# Patient Record
Sex: MALE | Race: WHITE | NOT HISPANIC OR LATINO | Employment: OTHER | URBAN - METROPOLITAN AREA
[De-identification: names, ages, dates, MRNs, and addresses within clinical notes are randomized per-mention and may not be internally consistent; named-entity substitution may affect disease eponyms.]

---

## 2017-04-05 ENCOUNTER — COMMUNICATION - HEALTHEAST (OUTPATIENT)
Dept: FAMILY MEDICINE | Facility: CLINIC | Age: 72
End: 2017-04-05

## 2017-04-05 DIAGNOSIS — N18.3 CHRONIC KIDNEY DISEASE (CKD), STAGE 3 (MODERATE): ICD-10-CM

## 2017-04-05 DIAGNOSIS — I10 ESSENTIAL HYPERTENSION: ICD-10-CM

## 2017-04-12 ENCOUNTER — RECORDS - HEALTHEAST (OUTPATIENT)
Dept: ADMINISTRATIVE | Facility: OTHER | Age: 72
End: 2017-04-12

## 2017-05-19 ENCOUNTER — OFFICE VISIT - HEALTHEAST (OUTPATIENT)
Dept: FAMILY MEDICINE | Facility: CLINIC | Age: 72
End: 2017-05-19

## 2017-05-19 DIAGNOSIS — N18.3 CHRONIC KIDNEY DISEASE (CKD), STAGE 3 (MODERATE): ICD-10-CM

## 2017-05-19 DIAGNOSIS — I10 ESSENTIAL HYPERTENSION: ICD-10-CM

## 2017-05-19 DIAGNOSIS — R43.2 LOSS OF TASTE: ICD-10-CM

## 2017-05-19 DIAGNOSIS — Z00.00 ROUTINE GENERAL MEDICAL EXAMINATION AT A HEALTH CARE FACILITY: ICD-10-CM

## 2017-05-19 DIAGNOSIS — D64.9 NORMOCHROMIC NORMOCYTIC ANEMIA: ICD-10-CM

## 2017-05-19 DIAGNOSIS — K40.90 INGUINAL HERNIA: ICD-10-CM

## 2017-05-19 DIAGNOSIS — K62.89 RECTAL MASS: ICD-10-CM

## 2017-05-19 LAB
ATRIAL RATE - MUSE: 54 BPM
CHOLEST SERPL-MCNC: 138 MG/DL
DIASTOLIC BLOOD PRESSURE - MUSE: NORMAL MMHG
FASTING STATUS PATIENT QL REPORTED: NO
HDLC SERPL-MCNC: 32 MG/DL
INTERPRETATION ECG - MUSE: NORMAL
LDLC SERPL CALC-MCNC: 84 MG/DL
P AXIS - MUSE: -10 DEGREES
PR INTERVAL - MUSE: 230 MS
PSA SERPL-MCNC: 1.4 NG/ML (ref 0–6.5)
QRS DURATION - MUSE: 96 MS
QT - MUSE: 430 MS
QTC - MUSE: 407 MS
R AXIS - MUSE: -16 DEGREES
SYSTOLIC BLOOD PRESSURE - MUSE: NORMAL MMHG
T AXIS - MUSE: 31 DEGREES
TRIGL SERPL-MCNC: 110 MG/DL
VENTRICULAR RATE- MUSE: 54 BPM

## 2017-05-19 ASSESSMENT — MIFFLIN-ST. JEOR: SCORE: 1491.03

## 2017-05-22 ENCOUNTER — COMMUNICATION - HEALTHEAST (OUTPATIENT)
Dept: FAMILY MEDICINE | Facility: CLINIC | Age: 72
End: 2017-05-22

## 2017-05-24 ENCOUNTER — COMMUNICATION - HEALTHEAST (OUTPATIENT)
Dept: FAMILY MEDICINE | Facility: CLINIC | Age: 72
End: 2017-05-24

## 2017-05-30 ENCOUNTER — RECORDS - HEALTHEAST (OUTPATIENT)
Dept: ADMINISTRATIVE | Facility: OTHER | Age: 72
End: 2017-05-30

## 2017-06-01 ENCOUNTER — RECORDS - HEALTHEAST (OUTPATIENT)
Dept: ADMINISTRATIVE | Facility: OTHER | Age: 72
End: 2017-06-01

## 2017-06-02 ENCOUNTER — OFFICE VISIT - HEALTHEAST (OUTPATIENT)
Dept: FAMILY MEDICINE | Facility: CLINIC | Age: 72
End: 2017-06-02

## 2017-06-02 DIAGNOSIS — K57.92 DIVERTICULITIS: ICD-10-CM

## 2017-06-22 ENCOUNTER — COMMUNICATION - HEALTHEAST (OUTPATIENT)
Dept: FAMILY MEDICINE | Facility: CLINIC | Age: 72
End: 2017-06-22

## 2017-06-22 DIAGNOSIS — I10 ESSENTIAL HYPERTENSION: ICD-10-CM

## 2017-06-22 DIAGNOSIS — N18.3 CHRONIC KIDNEY DISEASE (CKD), STAGE 3 (MODERATE): ICD-10-CM

## 2017-07-14 ENCOUNTER — COMMUNICATION - HEALTHEAST (OUTPATIENT)
Dept: FAMILY MEDICINE | Facility: CLINIC | Age: 72
End: 2017-07-14

## 2017-07-14 DIAGNOSIS — N18.3 CHRONIC KIDNEY DISEASE (CKD), STAGE 3 (MODERATE): ICD-10-CM

## 2017-07-14 DIAGNOSIS — I10 ESSENTIAL HYPERTENSION: ICD-10-CM

## 2017-08-25 ENCOUNTER — OFFICE VISIT - HEALTHEAST (OUTPATIENT)
Dept: FAMILY MEDICINE | Facility: CLINIC | Age: 72
End: 2017-08-25

## 2017-08-25 DIAGNOSIS — J32.9 SINUSITIS: ICD-10-CM

## 2017-08-25 DIAGNOSIS — J34.89 RHINORRHEA: ICD-10-CM

## 2017-08-25 ASSESSMENT — MIFFLIN-ST. JEOR: SCORE: 1504.64

## 2017-08-28 ENCOUNTER — HOSPITAL ENCOUNTER (OUTPATIENT)
Dept: CT IMAGING | Facility: HOSPITAL | Age: 72
Discharge: HOME | End: 2017-08-28
Attending: FAMILY MEDICINE

## 2017-08-28 DIAGNOSIS — J32.9 SINUSITIS: ICD-10-CM

## 2017-08-29 ENCOUNTER — RECORDS - HEALTHEAST (OUTPATIENT)
Dept: ADMINISTRATIVE | Facility: OTHER | Age: 72
End: 2017-08-29

## 2017-10-03 ENCOUNTER — RECORDS - HEALTHEAST (OUTPATIENT)
Dept: ADMINISTRATIVE | Facility: OTHER | Age: 72
End: 2017-10-03

## 2017-10-12 ENCOUNTER — COMMUNICATION - HEALTHEAST (OUTPATIENT)
Dept: FAMILY MEDICINE | Facility: CLINIC | Age: 72
End: 2017-10-12

## 2017-10-12 DIAGNOSIS — N18.30 STAGE 3 CHRONIC KIDNEY DISEASE (H): ICD-10-CM

## 2017-10-12 DIAGNOSIS — I10 ESSENTIAL HYPERTENSION: ICD-10-CM

## 2017-12-20 ENCOUNTER — OFFICE VISIT - HEALTHEAST (OUTPATIENT)
Dept: FAMILY MEDICINE | Facility: CLINIC | Age: 72
End: 2017-12-20

## 2017-12-20 DIAGNOSIS — I10 ESSENTIAL HYPERTENSION: ICD-10-CM

## 2017-12-20 DIAGNOSIS — K21.9 GERD (GASTROESOPHAGEAL REFLUX DISEASE): ICD-10-CM

## 2018-01-16 ENCOUNTER — COMMUNICATION - HEALTHEAST (OUTPATIENT)
Dept: FAMILY MEDICINE | Facility: CLINIC | Age: 73
End: 2018-01-16

## 2018-01-16 DIAGNOSIS — N18.30 STAGE 3 CHRONIC KIDNEY DISEASE (H): ICD-10-CM

## 2018-01-16 DIAGNOSIS — I10 ESSENTIAL HYPERTENSION: ICD-10-CM

## 2018-04-27 ENCOUNTER — OFFICE VISIT - HEALTHEAST (OUTPATIENT)
Dept: FAMILY MEDICINE | Facility: CLINIC | Age: 73
End: 2018-04-27

## 2018-04-27 DIAGNOSIS — I10 ESSENTIAL HYPERTENSION: ICD-10-CM

## 2018-04-27 DIAGNOSIS — K21.9 GERD (GASTROESOPHAGEAL REFLUX DISEASE): ICD-10-CM

## 2018-04-27 DIAGNOSIS — N18.30 STAGE 3 CHRONIC KIDNEY DISEASE (H): ICD-10-CM

## 2018-04-27 ASSESSMENT — MIFFLIN-ST. JEOR: SCORE: 1546.37

## 2018-06-08 ENCOUNTER — OFFICE VISIT - HEALTHEAST (OUTPATIENT)
Dept: FAMILY MEDICINE | Facility: CLINIC | Age: 73
End: 2018-06-08

## 2018-06-08 DIAGNOSIS — I10 ESSENTIAL HYPERTENSION: ICD-10-CM

## 2018-06-08 DIAGNOSIS — Z00.00 MEDICARE ANNUAL WELLNESS VISIT, INITIAL: ICD-10-CM

## 2018-06-08 DIAGNOSIS — Z80.0 FAMILY HISTORY OF COLON CANCER: ICD-10-CM

## 2018-06-08 DIAGNOSIS — N18.30 STAGE 3 CHRONIC KIDNEY DISEASE (H): ICD-10-CM

## 2018-06-08 LAB
ALBUMIN SERPL-MCNC: 4.1 G/DL (ref 3.5–5)
ALBUMIN UR-MCNC: NEGATIVE MG/DL
ALP SERPL-CCNC: 101 U/L (ref 45–120)
ALT SERPL W P-5'-P-CCNC: <9 U/L (ref 0–45)
ANION GAP SERPL CALCULATED.3IONS-SCNC: 11 MMOL/L (ref 5–18)
APPEARANCE UR: CLEAR
AST SERPL W P-5'-P-CCNC: 12 U/L (ref 0–40)
ATRIAL RATE - MUSE: 50 BPM
BASOPHILS # BLD AUTO: 0 THOU/UL (ref 0–0.2)
BASOPHILS NFR BLD AUTO: 0 % (ref 0–2)
BILIRUB SERPL-MCNC: 0.7 MG/DL (ref 0–1)
BILIRUB UR QL STRIP: NEGATIVE
BUN SERPL-MCNC: 33 MG/DL (ref 8–28)
CALCIUM SERPL-MCNC: 9.4 MG/DL (ref 8.5–10.5)
CHLORIDE BLD-SCNC: 111 MMOL/L (ref 98–107)
CHOLEST SERPL-MCNC: 154 MG/DL
CO2 SERPL-SCNC: 21 MMOL/L (ref 22–31)
COLOR UR AUTO: YELLOW
CREAT SERPL-MCNC: 2.6 MG/DL (ref 0.7–1.3)
DIASTOLIC BLOOD PRESSURE - MUSE: NORMAL MMHG
EOSINOPHIL # BLD AUTO: 0.3 THOU/UL (ref 0–0.4)
EOSINOPHIL NFR BLD AUTO: 5 % (ref 0–6)
ERYTHROCYTE [DISTWIDTH] IN BLOOD BY AUTOMATED COUNT: 13.4 % (ref 11–14.5)
FASTING STATUS PATIENT QL REPORTED: YES
GFR SERPL CREATININE-BSD FRML MDRD: 24 ML/MIN/1.73M2
GLUCOSE BLD-MCNC: 108 MG/DL (ref 70–125)
GLUCOSE UR STRIP-MCNC: NEGATIVE MG/DL
HCT VFR BLD AUTO: 40.4 % (ref 40–54)
HDLC SERPL-MCNC: 35 MG/DL
HGB BLD-MCNC: 13.9 G/DL (ref 14–18)
HGB UR QL STRIP: NEGATIVE
INTERPRETATION ECG - MUSE: NORMAL
KETONES UR STRIP-MCNC: NEGATIVE MG/DL
LDLC SERPL CALC-MCNC: 101 MG/DL
LEUKOCYTE ESTERASE UR QL STRIP: NEGATIVE
LYMPHOCYTES # BLD AUTO: 1.1 THOU/UL (ref 0.8–4.4)
LYMPHOCYTES NFR BLD AUTO: 16 % (ref 20–40)
MCH RBC QN AUTO: 30.3 PG (ref 27–34)
MCHC RBC AUTO-ENTMCNC: 34.3 G/DL (ref 32–36)
MCV RBC AUTO: 88 FL (ref 80–100)
MONOCYTES # BLD AUTO: 0.5 THOU/UL (ref 0–0.9)
MONOCYTES NFR BLD AUTO: 8 % (ref 2–10)
NEUTROPHILS # BLD AUTO: 4.9 THOU/UL (ref 2–7.7)
NEUTROPHILS NFR BLD AUTO: 71 % (ref 50–70)
NITRATE UR QL: NEGATIVE
P AXIS - MUSE: 93 DEGREES
PH UR STRIP: 5 [PH] (ref 5–8)
PLATELET # BLD AUTO: 148 THOU/UL (ref 140–440)
PMV BLD AUTO: 6.9 FL (ref 7–10)
POTASSIUM BLD-SCNC: 4.3 MMOL/L (ref 3.5–5)
PR INTERVAL - MUSE: 274 MS
PROT SERPL-MCNC: 7.1 G/DL (ref 6–8)
PSA SERPL-MCNC: 1.7 NG/ML (ref 0–6.5)
QRS DURATION - MUSE: 86 MS
QT - MUSE: 450 MS
QTC - MUSE: 410 MS
R AXIS - MUSE: -16 DEGREES
RBC # BLD AUTO: 4.57 MILL/UL (ref 4.4–6.2)
SODIUM SERPL-SCNC: 143 MMOL/L (ref 136–145)
SP GR UR STRIP: 1.01 (ref 1–1.03)
SYSTOLIC BLOOD PRESSURE - MUSE: NORMAL MMHG
T AXIS - MUSE: 22 DEGREES
TRIGL SERPL-MCNC: 90 MG/DL
UROBILINOGEN UR STRIP-ACNC: NORMAL
VENTRICULAR RATE- MUSE: 50 BPM
WBC: 6.8 THOU/UL (ref 4–11)

## 2018-06-08 ASSESSMENT — MIFFLIN-ST. JEOR: SCORE: 1545.01

## 2018-06-10 ENCOUNTER — COMMUNICATION - HEALTHEAST (OUTPATIENT)
Dept: FAMILY MEDICINE | Facility: CLINIC | Age: 73
End: 2018-06-10

## 2019-01-18 ENCOUNTER — COMMUNICATION - HEALTHEAST (OUTPATIENT)
Dept: FAMILY MEDICINE | Facility: CLINIC | Age: 74
End: 2019-01-18

## 2019-01-18 DIAGNOSIS — K21.9 GERD (GASTROESOPHAGEAL REFLUX DISEASE): ICD-10-CM

## 2019-02-22 ENCOUNTER — OFFICE VISIT - HEALTHEAST (OUTPATIENT)
Dept: FAMILY MEDICINE | Facility: CLINIC | Age: 74
End: 2019-02-22
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
## 2019-02-22 DIAGNOSIS — L53.9 ARM ERYTHEMA: ICD-10-CM

## 2019-02-22 DIAGNOSIS — M25.422 ELBOW SWELLING, LEFT: ICD-10-CM

## 2019-02-22 DIAGNOSIS — M70.22 OLECRANON BURSITIS OF LEFT ELBOW: ICD-10-CM

## 2019-02-22 DIAGNOSIS — L03.114 CELLULITIS OF LEFT UPPER EXTREMITY: ICD-10-CM

## 2019-02-22 LAB
APPEARANCE FLD: ABNORMAL
COLOR FLD: YELLOW
CRYSTALS SNV MICRO: ABNORMAL
GLUCOSE FLD-MCNC: 78 MG/DL
LYMPHOCYTES NFR FLD MANUAL: 17 %
MACROPHAGE % - HISTORICAL: 22 %
NEUTS BAND NFR FLD MANUAL: 62 %
PROT FLD-MCNC: 3.9 G/DL
RBC FLUID - HISTORICAL: ABNORMAL /UL
WBC # FLD AUTO: 1747 /UL (ref 0–99)

## 2019-02-25 LAB
BACTERIA SPEC CULT: NO GROWTH
GRAM STAIN RESULT: NORMAL
GRAM STAIN RESULT: NORMAL

## 2019-03-08 ENCOUNTER — OFFICE VISIT - HEALTHEAST (OUTPATIENT)
Dept: FAMILY MEDICINE | Facility: CLINIC | Age: 74
End: 2019-03-08

## 2019-03-08 DIAGNOSIS — M70.22 OLECRANON BURSITIS OF LEFT ELBOW: ICD-10-CM

## 2019-05-30 ENCOUNTER — RECORDS - HEALTHEAST (OUTPATIENT)
Dept: ADMINISTRATIVE | Facility: OTHER | Age: 74
End: 2019-05-30

## 2019-06-13 ENCOUNTER — AMBULATORY - HEALTHEAST (OUTPATIENT)
Dept: FAMILY MEDICINE | Facility: CLINIC | Age: 74
End: 2019-06-13

## 2019-06-13 ENCOUNTER — OFFICE VISIT - HEALTHEAST (OUTPATIENT)
Dept: FAMILY MEDICINE | Facility: CLINIC | Age: 74
End: 2019-06-13

## 2019-06-13 DIAGNOSIS — N18.30 STAGE 3 CHRONIC KIDNEY DISEASE (H): ICD-10-CM

## 2019-06-13 DIAGNOSIS — Z12.5 ENCOUNTER FOR SCREENING FOR MALIGNANT NEOPLASM OF PROSTATE: ICD-10-CM

## 2019-06-13 DIAGNOSIS — Z80.0 FAMILY HISTORY OF COLON CANCER: ICD-10-CM

## 2019-06-13 DIAGNOSIS — I10 ESSENTIAL HYPERTENSION: ICD-10-CM

## 2019-06-13 DIAGNOSIS — Z00.00 ENCOUNTER FOR MEDICARE ANNUAL WELLNESS EXAM: ICD-10-CM

## 2019-06-13 LAB
ALBUMIN SERPL-MCNC: 4.4 G/DL (ref 3.5–5)
ALBUMIN UR-MCNC: NEGATIVE MG/DL
ALP SERPL-CCNC: 91 U/L (ref 45–120)
ALT SERPL W P-5'-P-CCNC: <9 U/L (ref 0–45)
ANION GAP SERPL CALCULATED.3IONS-SCNC: 11 MMOL/L (ref 5–18)
APPEARANCE UR: CLEAR
AST SERPL W P-5'-P-CCNC: 14 U/L (ref 0–40)
ATRIAL RATE - MUSE: 52 BPM
BILIRUB SERPL-MCNC: 0.6 MG/DL (ref 0–1)
BILIRUB UR QL STRIP: NEGATIVE
BUN SERPL-MCNC: 43 MG/DL (ref 8–28)
CALCIUM SERPL-MCNC: 9.5 MG/DL (ref 8.5–10.5)
CHLORIDE BLD-SCNC: 109 MMOL/L (ref 98–107)
CHOLEST SERPL-MCNC: 141 MG/DL
CO2 SERPL-SCNC: 21 MMOL/L (ref 22–31)
COLOR UR AUTO: YELLOW
CREAT SERPL-MCNC: 3.24 MG/DL (ref 0.7–1.3)
DIASTOLIC BLOOD PRESSURE - MUSE: NORMAL MMHG
ERYTHROCYTE [DISTWIDTH] IN BLOOD BY AUTOMATED COUNT: 11.9 % (ref 11–14.5)
FASTING STATUS PATIENT QL REPORTED: NO
GFR SERPL CREATININE-BSD FRML MDRD: 19 ML/MIN/1.73M2
GLUCOSE BLD-MCNC: 101 MG/DL (ref 70–125)
GLUCOSE UR STRIP-MCNC: NEGATIVE MG/DL
HCT VFR BLD AUTO: 40.2 % (ref 40–54)
HDLC SERPL-MCNC: 38 MG/DL
HGB BLD-MCNC: 13.4 G/DL (ref 14–18)
HGB UR QL STRIP: NEGATIVE
INTERPRETATION ECG - MUSE: NORMAL
KETONES UR STRIP-MCNC: NEGATIVE MG/DL
LDLC SERPL CALC-MCNC: 87 MG/DL
LEUKOCYTE ESTERASE UR QL STRIP: NEGATIVE
MCH RBC QN AUTO: 30.7 PG (ref 27–34)
MCHC RBC AUTO-ENTMCNC: 33.2 G/DL (ref 32–36)
MCV RBC AUTO: 92 FL (ref 80–100)
NITRATE UR QL: NEGATIVE
P AXIS - MUSE: 61 DEGREES
PH UR STRIP: 5.5 [PH] (ref 5–8)
PLATELET # BLD AUTO: 153 THOU/UL (ref 140–440)
PMV BLD AUTO: 7.7 FL (ref 7–10)
POTASSIUM BLD-SCNC: 4.3 MMOL/L (ref 3.5–5)
PR INTERVAL - MUSE: 266 MS
PROT SERPL-MCNC: 7.3 G/DL (ref 6–8)
PSA SERPL-MCNC: 1.5 NG/ML (ref 0–6.5)
QRS DURATION - MUSE: 92 MS
QT - MUSE: 452 MS
QTC - MUSE: 420 MS
R AXIS - MUSE: -12 DEGREES
RBC # BLD AUTO: 4.35 MILL/UL (ref 4.4–6.2)
SODIUM SERPL-SCNC: 141 MMOL/L (ref 136–145)
SP GR UR STRIP: 1.02 (ref 1–1.03)
SYSTOLIC BLOOD PRESSURE - MUSE: NORMAL MMHG
T AXIS - MUSE: 42 DEGREES
TRIGL SERPL-MCNC: 78 MG/DL
UROBILINOGEN UR STRIP-ACNC: NORMAL
VENTRICULAR RATE- MUSE: 52 BPM
WBC: 8.3 THOU/UL (ref 4–11)

## 2019-06-13 ASSESSMENT — MIFFLIN-ST. JEOR: SCORE: 1522.78

## 2019-06-14 ENCOUNTER — COMMUNICATION - HEALTHEAST (OUTPATIENT)
Dept: FAMILY MEDICINE | Facility: CLINIC | Age: 74
End: 2019-06-14

## 2019-06-14 ENCOUNTER — AMBULATORY - HEALTHEAST (OUTPATIENT)
Dept: FAMILY MEDICINE | Facility: CLINIC | Age: 74
End: 2019-06-14

## 2019-06-14 DIAGNOSIS — N18.4 CRF (CHRONIC RENAL FAILURE), STAGE 4 (SEVERE) (H): ICD-10-CM

## 2019-06-20 ENCOUNTER — COMMUNICATION - HEALTHEAST (OUTPATIENT)
Dept: SCHEDULING | Facility: CLINIC | Age: 74
End: 2019-06-20

## 2019-07-02 ENCOUNTER — RECORDS - HEALTHEAST (OUTPATIENT)
Dept: ADMINISTRATIVE | Facility: OTHER | Age: 74
End: 2019-07-02

## 2019-07-05 ENCOUNTER — COMMUNICATION - HEALTHEAST (OUTPATIENT)
Dept: FAMILY MEDICINE | Facility: CLINIC | Age: 74
End: 2019-07-05

## 2019-07-05 DIAGNOSIS — N18.30 STAGE 3 CHRONIC KIDNEY DISEASE (H): ICD-10-CM

## 2019-07-05 DIAGNOSIS — I10 ESSENTIAL HYPERTENSION: ICD-10-CM

## 2019-07-11 ENCOUNTER — HOSPITAL ENCOUNTER (OUTPATIENT)
Dept: CT IMAGING | Facility: HOSPITAL | Age: 74
Discharge: HOME | End: 2019-07-11
Attending: INTERNAL MEDICINE

## 2019-07-11 DIAGNOSIS — R79.89 LOW VITAMIN D LEVEL: ICD-10-CM

## 2019-07-11 DIAGNOSIS — K21.9 CHRONIC GERD: ICD-10-CM

## 2019-07-11 DIAGNOSIS — I10 HTN (HYPERTENSION): ICD-10-CM

## 2019-07-11 DIAGNOSIS — N20.0 URIC ACID NEPHROLITHIASIS: ICD-10-CM

## 2019-07-11 DIAGNOSIS — N18.4 STAGE 4 CHRONIC KIDNEY DISEASE (H): ICD-10-CM

## 2019-08-07 ENCOUNTER — RECORDS - HEALTHEAST (OUTPATIENT)
Dept: ADMINISTRATIVE | Facility: OTHER | Age: 74
End: 2019-08-07

## 2019-08-22 ENCOUNTER — COMMUNICATION - HEALTHEAST (OUTPATIENT)
Dept: FAMILY MEDICINE | Facility: CLINIC | Age: 74
End: 2019-08-22

## 2019-08-22 DIAGNOSIS — K21.9 GERD (GASTROESOPHAGEAL REFLUX DISEASE): ICD-10-CM

## 2019-12-05 ENCOUNTER — COMMUNICATION - HEALTHEAST (OUTPATIENT)
Dept: FAMILY MEDICINE | Facility: CLINIC | Age: 74
End: 2019-12-05

## 2019-12-09 ENCOUNTER — OFFICE VISIT - HEALTHEAST (OUTPATIENT)
Dept: FAMILY MEDICINE | Facility: CLINIC | Age: 74
End: 2019-12-09

## 2019-12-09 DIAGNOSIS — Z01.818 PREOP GENERAL PHYSICAL EXAM: ICD-10-CM

## 2019-12-09 LAB
BASOPHILS # BLD AUTO: 0 THOU/UL (ref 0–0.2)
BASOPHILS NFR BLD AUTO: 0 % (ref 0–2)
EOSINOPHIL # BLD AUTO: 0.2 THOU/UL (ref 0–0.4)
EOSINOPHIL NFR BLD AUTO: 4 % (ref 0–6)
ERYTHROCYTE [DISTWIDTH] IN BLOOD BY AUTOMATED COUNT: 13.2 % (ref 11–14.5)
HCT VFR BLD AUTO: 34.4 % (ref 40–54)
HGB BLD-MCNC: 12 G/DL (ref 14–18)
LYMPHOCYTES # BLD AUTO: 1.3 THOU/UL (ref 0.8–4.4)
LYMPHOCYTES NFR BLD AUTO: 23 % (ref 20–40)
MCH RBC QN AUTO: 31.7 PG (ref 27–34)
MCHC RBC AUTO-ENTMCNC: 34.9 G/DL (ref 32–36)
MCV RBC AUTO: 91 FL (ref 80–100)
MONOCYTES # BLD AUTO: 0.4 THOU/UL (ref 0–0.9)
MONOCYTES NFR BLD AUTO: 7 % (ref 2–10)
NEUTROPHILS # BLD AUTO: 3.7 THOU/UL (ref 2–7.7)
NEUTROPHILS NFR BLD AUTO: 66 % (ref 50–70)
PLATELET # BLD AUTO: 181 THOU/UL (ref 140–440)
PMV BLD AUTO: 6.9 FL (ref 7–10)
POTASSIUM BLD-SCNC: 4.8 MMOL/L (ref 3.5–5)
RBC # BLD AUTO: 3.78 MILL/UL (ref 4.4–6.2)
WBC: 5.6 THOU/UL (ref 4–11)

## 2019-12-09 ASSESSMENT — MIFFLIN-ST. JEOR: SCORE: 1531.17

## 2019-12-11 ENCOUNTER — COMMUNICATION - HEALTHEAST (OUTPATIENT)
Dept: FAMILY MEDICINE | Facility: CLINIC | Age: 74
End: 2019-12-11

## 2020-01-08 ENCOUNTER — RECORDS - HEALTHEAST (OUTPATIENT)
Dept: ADMINISTRATIVE | Facility: OTHER | Age: 75
End: 2020-01-08

## 2020-01-13 ENCOUNTER — OFFICE VISIT - HEALTHEAST (OUTPATIENT)
Dept: FAMILY MEDICINE | Facility: CLINIC | Age: 75
End: 2020-01-13

## 2020-01-13 DIAGNOSIS — D50.8 OTHER IRON DEFICIENCY ANEMIA: ICD-10-CM

## 2020-01-13 DIAGNOSIS — D50.8 IRON DEFICIENCY ANEMIA SECONDARY TO INADEQUATE DIETARY IRON INTAKE: ICD-10-CM

## 2020-01-13 LAB
FERRITIN SERPL-MCNC: 76 NG/ML (ref 27–300)
FOLATE SERPL-MCNC: 7.5 NG/ML
IRON SATN MFR SERPL: 33 % (ref 20–50)
IRON SERPL-MCNC: 90 UG/DL (ref 42–175)
TIBC SERPL-MCNC: 270 UG/DL (ref 313–563)
TRANSFERRIN SERPL-MCNC: 216 MG/DL (ref 212–360)
VIT B12 SERPL-MCNC: 268 PG/ML (ref 213–816)

## 2020-01-14 ENCOUNTER — COMMUNICATION - HEALTHEAST (OUTPATIENT)
Dept: FAMILY MEDICINE | Facility: CLINIC | Age: 75
End: 2020-01-14

## 2020-01-15 ENCOUNTER — RECORDS - HEALTHEAST (OUTPATIENT)
Dept: ADMINISTRATIVE | Facility: OTHER | Age: 75
End: 2020-01-15

## 2020-03-26 ENCOUNTER — COMMUNICATION - HEALTHEAST (OUTPATIENT)
Dept: FAMILY MEDICINE | Facility: CLINIC | Age: 75
End: 2020-03-26

## 2020-04-27 ENCOUNTER — AMBULATORY - HEALTHEAST (OUTPATIENT)
Dept: FAMILY MEDICINE | Facility: CLINIC | Age: 75
End: 2020-04-27

## 2020-04-27 ENCOUNTER — OFFICE VISIT - HEALTHEAST (OUTPATIENT)
Dept: FAMILY MEDICINE | Facility: CLINIC | Age: 75
End: 2020-04-27

## 2020-04-27 DIAGNOSIS — N18.30 STAGE 3 CHRONIC KIDNEY DISEASE (H): ICD-10-CM

## 2020-04-27 DIAGNOSIS — D50.8 IRON DEFICIENCY ANEMIA SECONDARY TO INADEQUATE DIETARY IRON INTAKE: ICD-10-CM

## 2020-04-27 DIAGNOSIS — I10 ESSENTIAL HYPERTENSION: ICD-10-CM

## 2020-05-07 ENCOUNTER — COMMUNICATION - HEALTHEAST (OUTPATIENT)
Dept: FAMILY MEDICINE | Facility: CLINIC | Age: 75
End: 2020-05-07

## 2020-05-14 ENCOUNTER — COMMUNICATION - HEALTHEAST (OUTPATIENT)
Dept: SCHEDULING | Facility: CLINIC | Age: 75
End: 2020-05-14

## 2020-05-14 ENCOUNTER — OFFICE VISIT - HEALTHEAST (OUTPATIENT)
Dept: FAMILY MEDICINE | Facility: CLINIC | Age: 75
End: 2020-05-14

## 2020-05-14 ENCOUNTER — COMMUNICATION - HEALTHEAST (OUTPATIENT)
Dept: FAMILY MEDICINE | Facility: CLINIC | Age: 75
End: 2020-05-14

## 2020-05-14 ENCOUNTER — AMBULATORY - HEALTHEAST (OUTPATIENT)
Dept: FAMILY MEDICINE | Facility: CLINIC | Age: 75
End: 2020-05-14

## 2020-05-14 DIAGNOSIS — Z11.59 SCREENING FOR VIRAL DISEASE: ICD-10-CM

## 2020-05-18 ENCOUNTER — OFFICE VISIT - HEALTHEAST (OUTPATIENT)
Dept: FAMILY MEDICINE | Facility: CLINIC | Age: 75
End: 2020-05-18

## 2020-05-18 DIAGNOSIS — Z01.818 PREOP GENERAL PHYSICAL EXAM: ICD-10-CM

## 2020-05-18 DIAGNOSIS — K21.00 GASTROESOPHAGEAL REFLUX DISEASE WITH ESOPHAGITIS: ICD-10-CM

## 2020-05-18 LAB
ATRIAL RATE - MUSE: 53 BPM
BASOPHILS # BLD AUTO: 0 THOU/UL (ref 0–0.2)
BASOPHILS NFR BLD AUTO: 1 % (ref 0–2)
DIASTOLIC BLOOD PRESSURE - MUSE: NORMAL
EOSINOPHIL # BLD AUTO: 0.2 THOU/UL (ref 0–0.4)
EOSINOPHIL NFR BLD AUTO: 2 % (ref 0–6)
ERYTHROCYTE [DISTWIDTH] IN BLOOD BY AUTOMATED COUNT: 12.9 % (ref 11–14.5)
HCT VFR BLD AUTO: 39.2 % (ref 40–54)
HGB BLD-MCNC: 13.5 G/DL (ref 14–18)
INTERPRETATION ECG - MUSE: NORMAL
LYMPHOCYTES # BLD AUTO: 1.1 THOU/UL (ref 0.8–4.4)
LYMPHOCYTES NFR BLD AUTO: 16 % (ref 20–40)
MCH RBC QN AUTO: 31.6 PG (ref 27–34)
MCHC RBC AUTO-ENTMCNC: 34.4 G/DL (ref 32–36)
MCV RBC AUTO: 92 FL (ref 80–100)
MONOCYTES # BLD AUTO: 0.5 THOU/UL (ref 0–0.9)
MONOCYTES NFR BLD AUTO: 7 % (ref 2–10)
NEUTROPHILS # BLD AUTO: 5.3 THOU/UL (ref 2–7.7)
NEUTROPHILS NFR BLD AUTO: 74 % (ref 50–70)
P AXIS - MUSE: NORMAL
PLATELET # BLD AUTO: 153 THOU/UL (ref 140–440)
PMV BLD AUTO: 7.8 FL (ref 7–10)
POTASSIUM BLD-SCNC: 5.4 MMOL/L (ref 3.5–5)
PR INTERVAL - MUSE: NORMAL
QRS DURATION - MUSE: 88 MS
QT - MUSE: 424 MS
QTC - MUSE: 397 MS
R AXIS - MUSE: -17 DEGREES
RBC # BLD AUTO: 4.27 MILL/UL (ref 4.4–6.2)
SYSTOLIC BLOOD PRESSURE - MUSE: NORMAL
T AXIS - MUSE: 36 DEGREES
VENTRICULAR RATE- MUSE: 53 BPM
WBC: 7.1 THOU/UL (ref 4–11)

## 2020-08-13 ENCOUNTER — OFFICE VISIT - HEALTHEAST (OUTPATIENT)
Dept: FAMILY MEDICINE | Facility: CLINIC | Age: 75
End: 2020-08-13

## 2020-08-13 DIAGNOSIS — N18.30 STAGE 3 CHRONIC KIDNEY DISEASE (H): ICD-10-CM

## 2020-08-13 DIAGNOSIS — D63.1 ANEMIA DUE TO STAGE 3 CHRONIC KIDNEY DISEASE (H): ICD-10-CM

## 2020-08-13 DIAGNOSIS — Z12.5 ENCOUNTER FOR SCREENING FOR MALIGNANT NEOPLASM OF PROSTATE: ICD-10-CM

## 2020-08-13 DIAGNOSIS — I10 ESSENTIAL HYPERTENSION: ICD-10-CM

## 2020-08-13 DIAGNOSIS — Z00.00 ENCOUNTER FOR MEDICARE ANNUAL WELLNESS EXAM: ICD-10-CM

## 2020-08-13 DIAGNOSIS — N18.30 ANEMIA DUE TO STAGE 3 CHRONIC KIDNEY DISEASE (H): ICD-10-CM

## 2020-08-13 LAB
ALBUMIN SERPL-MCNC: 4.6 G/DL (ref 3.5–5)
ALBUMIN UR-MCNC: NEGATIVE MG/DL
ALP SERPL-CCNC: 85 U/L (ref 45–120)
ALT SERPL W P-5'-P-CCNC: <9 U/L (ref 0–45)
ANION GAP SERPL CALCULATED.3IONS-SCNC: 10 MMOL/L (ref 5–18)
APPEARANCE UR: CLEAR
AST SERPL W P-5'-P-CCNC: 12 U/L (ref 0–40)
BILIRUB SERPL-MCNC: 0.7 MG/DL (ref 0–1)
BILIRUB UR QL STRIP: NEGATIVE
BUN SERPL-MCNC: 44 MG/DL (ref 8–28)
CALCIUM SERPL-MCNC: 9.6 MG/DL (ref 8.5–10.5)
CHLORIDE BLD-SCNC: 111 MMOL/L (ref 98–107)
CO2 SERPL-SCNC: 20 MMOL/L (ref 22–31)
COLOR UR AUTO: YELLOW
CREAT SERPL-MCNC: 2.94 MG/DL (ref 0.7–1.3)
ERYTHROCYTE [DISTWIDTH] IN BLOOD BY AUTOMATED COUNT: 12.1 % (ref 11–14.5)
GFR SERPL CREATININE-BSD FRML MDRD: 21 ML/MIN/1.73M2
GLUCOSE BLD-MCNC: 91 MG/DL (ref 70–125)
GLUCOSE UR STRIP-MCNC: NEGATIVE MG/DL
HCT VFR BLD AUTO: 39.6 % (ref 40–54)
HGB BLD-MCNC: 13.7 G/DL (ref 14–18)
HGB UR QL STRIP: NEGATIVE
KETONES UR STRIP-MCNC: NEGATIVE MG/DL
LEUKOCYTE ESTERASE UR QL STRIP: NEGATIVE
MCH RBC QN AUTO: 32.3 PG (ref 27–34)
MCHC RBC AUTO-ENTMCNC: 34.6 G/DL (ref 32–36)
MCV RBC AUTO: 93 FL (ref 80–100)
NITRATE UR QL: NEGATIVE
PH UR STRIP: 5 [PH] (ref 5–8)
PLATELET # BLD AUTO: 162 THOU/UL (ref 140–440)
PMV BLD AUTO: 7 FL (ref 7–10)
POTASSIUM BLD-SCNC: 5.5 MMOL/L (ref 3.5–5)
PROT SERPL-MCNC: 7.5 G/DL (ref 6–8)
PSA SERPL-MCNC: 1.4 NG/ML (ref 0–6.5)
RBC # BLD AUTO: 4.24 MILL/UL (ref 4.4–6.2)
SODIUM SERPL-SCNC: 141 MMOL/L (ref 136–145)
SP GR UR STRIP: 1.02 (ref 1–1.03)
UROBILINOGEN UR STRIP-ACNC: NORMAL
WBC: 8 THOU/UL (ref 4–11)

## 2020-08-14 ENCOUNTER — COMMUNICATION - HEALTHEAST (OUTPATIENT)
Dept: FAMILY MEDICINE | Facility: CLINIC | Age: 75
End: 2020-08-14

## 2020-10-03 ENCOUNTER — COMMUNICATION - HEALTHEAST (OUTPATIENT)
Dept: FAMILY MEDICINE | Facility: CLINIC | Age: 75
End: 2020-10-03

## 2020-10-03 DIAGNOSIS — K21.00 GASTROESOPHAGEAL REFLUX DISEASE WITH ESOPHAGITIS: ICD-10-CM

## 2020-12-03 ENCOUNTER — RECORDS - HEALTHEAST (OUTPATIENT)
Dept: ADMINISTRATIVE | Facility: OTHER | Age: 75
End: 2020-12-03

## 2021-03-10 ENCOUNTER — AMBULATORY - HEALTHEAST (OUTPATIENT)
Dept: NURSING | Facility: CLINIC | Age: 76
End: 2021-03-10

## 2021-03-31 ENCOUNTER — RECORDS - HEALTHEAST (OUTPATIENT)
Dept: ADMINISTRATIVE | Facility: OTHER | Age: 76
End: 2021-03-31

## 2021-03-31 ENCOUNTER — AMBULATORY - HEALTHEAST (OUTPATIENT)
Dept: NURSING | Facility: CLINIC | Age: 76
End: 2021-03-31

## 2021-05-29 NOTE — PROGRESS NOTES
gloria  Assessment and Plan:   Macario presents for his annual physical exam.  He has been under my care for years.  Patient has benign essential hypertension which is well controlled with lisinopril hydrochlorothiazide 20/25 once daily.  He does have seasonal allergic rhinitis and GERD which fortunately are both managed with daily use of ranitidine 150 mg.  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  He denies any visual disturbances he has had eye surgery hearing is good no dysphasia shortness of breath dyspnea chest pain angina abdominal pain his large inguinal hernia does not bother him gait and station normal he is very active he does not swim as much as he used to but he does do daily activities he.  He does continue to bowl.  He has had a difficult past medical history with recurrent Bell's palsy but fortunately has recovered from that.  We have done a medication review today he does take a baby aspirin in addition to his space vitamin C.  I will see him for follow-up on an annual basis.  All medical questions asked were answered.    1. Stage 3 chronic kidney disease (H)  Following will be investigated we will check his GFR  - Comprehensive Metabolic Panel  - PSA (Prostatic-Specific Antigen), Annual Screen  - Urinalysis-UC if Indicated    2. Essential hypertension  No change in medication continue lisinopril hydrochlorothiazide  - Electrocardiogram Perform - Clinic  - Lipid [facility]    3. Family history of colon cancer  Work-up to include the following for cancer screening  - PSA (Prostatic-Specific Antigen), Annual Screen    4. Encounter for Medicare annual wellness exam  Following work-up  - Comprehensive Metabolic Panel  - Electrocardiogram Perform - Clinic  - HM2(CBC w/o Differential)  - Lipid [place]  - Urinalysis-UC if Indicated    5. Encounter for screening for malignant neoplasm of prostate   Patient appears younger than stated age therefore we will proceed with prostate screening  - PSA (Prostatic-Specific  Antigen), Annual Screen     The patient's current medical problems were reviewed.    I have had an Advance Directives discussion with the patient.  The following health maintenance schedule was reviewed with the patient and provided in printed form in the after visit summary:   Health Maintenance   Topic Date Due     ZOSTER VACCINES (2 of 3) 08/06/2012     FALL RISK ASSESSMENT  06/08/2019     TD 18+ HE  06/28/2019 (Originally 9/30/1963)     PNEUMOCOCCAL CONJUGATE VACCINE FOR ADULTS (PCV13 OR PREVNAR)  06/28/2019 (Originally 9/30/2008)     PNEUMOCOCCAL POLYSACCHARIDE VACCINE AGE 65 AND OVER  06/29/2019 (Originally 9/30/2008)     INFLUENZA VACCINE RULE BASED (Season Ended) 08/01/2019     COLONOSCOPY  05/30/2022     ADVANCE DIRECTIVES DISCUSSED WITH PATIENT  06/08/2023        Subjective:   Chief Complaint: Ion Guallpa Jr. is an 73 y.o. male here for an Annual Wellness visit.   HPI: See under assessment and plan    Review of Systems: 14 point review of systems negative please see above.  The rest of the review of systems are negative for all systems.    Patient Care Team:  Ion Lofton MD as PCP - General (Family Medicine)     Patient Active Problem List   Diagnosis     Family history of colon cancer     Detached retina, right     Elevated blood pressure     Overweight (BMI 25.0-29.9)     Normochromic normocytic anemia     Stage 3 chronic kidney disease (H)     Hypertension     Past Medical History:   Diagnosis Date     Cataract       Past Surgical History:   Procedure Laterality Date     EYE SURGERY        Family History   Problem Relation Age of Onset     Cancer Father         lung     Cancer Brother         colon CA with mets to liver      Social History     Socioeconomic History     Marital status: Single     Spouse name: Not on file     Number of children: Not on file     Years of education: Not on file     Highest education level: Not on file   Occupational History     Not on file   Social Needs      "Financial resource strain: Not on file     Food insecurity:     Worry: Not on file     Inability: Not on file     Transportation needs:     Medical: Not on file     Non-medical: Not on file   Tobacco Use     Smoking status: Never Smoker     Smokeless tobacco: Never Used   Substance and Sexual Activity     Alcohol use: No     Drug use: No     Sexual activity: Not on file   Lifestyle     Physical activity:     Days per week: Not on file     Minutes per session: Not on file     Stress: Not on file   Relationships     Social connections:     Talks on phone: Not on file     Gets together: Not on file     Attends Rastafari service: Not on file     Active member of club or organization: Not on file     Attends meetings of clubs or organizations: Not on file     Relationship status: Not on file     Intimate partner violence:     Fear of current or ex partner: Not on file     Emotionally abused: Not on file     Physically abused: Not on file     Forced sexual activity: Not on file   Other Topics Concern     Not on file   Social History Narrative     Not on file      Current Outpatient Medications   Medication Sig Dispense Refill     aspirin 81 MG EC tablet Take 81 mg by mouth daily.       lisinopril-hydrochlorothiazide (PRINZIDE,ZESTORETIC) 20-25 mg per tablet Take 1 tablet by mouth daily. 90 tablet 3     ranitidine (ZANTAC) 150 MG tablet TAKE 1 TABLET BY MOUTH TWICE DAILY 120 tablet 1     ascorbic acid, vitamin C, (ASCORBIC ACID WITH MOE HIPS) 500 MG tablet Take 500 mg by mouth daily.       multivit-minerals/FA/lycopene (MEN'S DAILY MULTIVIT-MINERAL ORAL) Take by mouth.       No current facility-administered medications for this visit.       Objective:   Vital Signs:   Visit Vitals  /62   Pulse (!) 56   Ht 5' 9\" (1.753 m)   Wt 178 lb (80.7 kg)   SpO2 98%   BMI 26.29 kg/m         VisionScreening:  No exam data present     PHYSICAL EXAM  General Appearance:  Alert, cooperative, no distress  Head:  Normocephalic, no " obvious abnormality  Ears: TM anatomy normal  Eyes:  PERRL, EOM's intact, conjunctiva and corneas clear  Nose:  Nares symmetrical, septum midline, mucosa pink, no sinus tenderness  Throat:  Lips, tongue, and mucosa are moist, pink, and intact  Neck:  Supple, symmetrical, trachea midline, no adenopathy; thyroid: no enlargement, symmetric,no tenderness/mass/nodules; no carotid bruit, no JVD  Back:  Symmetrical, no curvature, ROM normal, no CVA tenderness  Chest/Breast:  No mass or tenderness  Lungs:  Clear to auscultation bilaterally, respirations unlabored   Heart:  Normal PMI, regular rate & rhythm, S1 and S2 normal, no murmurs, rubs, or gallops  Abdomen:  Soft, non-tender, bowel sounds active all four quadrants, no mass, or organomegaly  Musculoskeletal:  Tone and strength strong and symmetrical, all extremities  Lymphatic:  No adenopathy  Skin/Hair/Nails:  Skin warm, dry, and intact, no rashes  Neurologic:  Alert and oriented x3, no cranial nerve deficits, normal strength and tone, gait steady  Extremities:  No edema.  Shanell's sign negative.    Genitourinary: Circumcised male testes down large right direct inguinal hernia no hemorrhoids prostate exam normal small space at 40 mL  Pulses:  Equal bilaterally    Assessment Results 6/13/2019   Activities of Daily Living No help needed   Instrumental Activities of Daily Living No help needed   Mini Cog Total Score 5   Some recent data might be hidden     A Mini-Cog score of 0-2 suggests the possibility of dementia, score of 3-5 suggests no dementia    Identified Health Risks:     Information regarding advance directives (living [place]), including where he can download the appropriate form, was provided to the patient via the AVS.

## 2021-05-29 NOTE — TELEPHONE ENCOUNTER
Who is calling:  Ion Guallpa  Reason for Call:  Needs to know who is scheduling.  And who should he be scheduling with?  Call at [phone]  Date of last appointment with primary care: 06/13/19  Okay to leave a detailed message: Yes

## 2021-05-29 NOTE — TELEPHONE ENCOUNTER
LVM for patient. Informed him that [facility] will be reaching out to him to schedule next week once their July schedules are completed in their system. Gave him their contact number as well.

## 2021-05-30 NOTE — TELEPHONE ENCOUNTER
Refill Approved    Rx renewed per Medication Renewal Policy. Medication was last renewed on 4/27/2018.         Andre Nicole, [facility] Triage/Med Refill 7/7/2019     Requested Prescriptions   Pending Prescriptions Disp Refills     lisinopril-hydrochlorothiazide (PRINZIDE,ZESTORETIC) 20-25 mg per tablet [Pharmacy Med Name: LISINOPRIL-HCTZ 20/25MG TABLETS] 90 tablet 0     Sig: TAKE 1 TABLET BY MOUTH DAILY       Diuretics/Combination Diuretics Refill Protocol  Passed - 7/5/2019  7:58 PM        Passed - Visit with PCP or prescribing provider visit in past 12 months     Last office visit with prescriber/PCP: 4/27/2018 Ion Lofton MD OR same dept: 3/8/2019 Radha Yanez MD OR same specialty: 3/8/2019 Radha Yanez MD  Last physical: 6/13/2019 Last MTM visit: Visit date not found   Next visit within 3 mo: Visit date not found  Next physical within 3 mo: Visit date not found  Prescriber OR PCP: Ion Lofton MD  Last diagnosis associated with med order: 1. Essential hypertension  - lisinopril-hydrochlorothiazide (PRINZIDE,ZESTORETIC) 20-25 mg per tablet [Pharmacy Med Name: LISINOPRIL-HCTZ 20/25MG TABLETS]; Take 1 tablet by mouth daily.  Dispense: 90 tablet; Refill: 0    2. Stage 3 chronic kidney disease (H)  - lisinopril-hydrochlorothiazide (PRINZIDE,ZESTORETIC) 20-25 mg per tablet [Pharmacy Med Name: LISINOPRIL-HCTZ 20/25MG TABLETS]; Take 1 tablet by mouth daily.  Dispense: 90 tablet; Refill: 0    If protocol passes may refill for 12 months if within 3 months of last provider visit (or a total of 15 months).             Passed - Serum Potassium in past 12 months      Lab Results   Component Value Date    Potassium 4.3 06/13/2019             Passed - Serum Sodium in past 12 months      Lab Results   Component Value Date    Sodium 141 06/13/2019             Passed - Blood pressure on file in past 12 months     BP Readings from Last 1 Encounters:   06/13/19 122/62             Passed - Serum Creatinine  in past 12 months      Creatinine   Date Value Ref Range Status   06/13/2019 3.24 (H) 0.70 - 1.30 mg/dL Final

## 2021-05-31 VITALS — HEIGHT: 69 IN | BODY MASS INDEX: 25.33 KG/M2 | WEIGHT: 171 LBS

## 2021-05-31 VITALS — HEIGHT: 69 IN | BODY MASS INDEX: 25.77 KG/M2 | WEIGHT: 174 LBS

## 2021-05-31 VITALS — WEIGHT: 170 LBS | BODY MASS INDEX: 25.1 KG/M2

## 2021-05-31 NOTE — TELEPHONE ENCOUNTER
Refill Approved    Rx renewed per Medication Renewal Policy. Medication was last renewed on 1/19/19.    Chen Bentley, [facility] Triage/Med Refill 8/23/2019     Requested Prescriptions   Pending Prescriptions Disp Refills     ranitidine (ZANTAC) 150 MG tablet [Pharmacy Med Name: RANITIDINE 150MG TABLETS] 180 tablet 0     Sig: TAKE 1 TABLET BY MOUTH TWICE DAILY       GI Medications Refill Protocol Passed - 8/22/2019  7:25 PM        Passed - PCP or prescribing provider visit in last 12 or next 3 months.     Last office visit with prescriber/PCP: 4/27/2018 Ion Lofton MD OR same dept: 3/8/2019 Radha Yanez MD OR same specialty: 3/8/2019 Radha Yanez MD  Last physical: 6/13/2019 Last MTM visit: Visit date not found   Next visit within 3 mo: Visit date not found  Next physical within 3 mo: Visit date not found  Prescriber OR PCP: Ion Lofton MD  Last diagnosis associated with med order: 1. GERD (gastroesophageal reflux disease)  - ranitidine (ZANTAC) 150 MG tablet [Pharmacy Med Name: RANITIDINE 150MG TABLETS]; TAKE 1 TABLET BY MOUTH TWICE DAILY  Dispense: 180 tablet; Refill: 0    If protocol passes may refill for 12 months if within 3 months of last provider visit (or a total of 15 months).

## 2021-06-01 ENCOUNTER — COMMUNICATION - HEALTHEAST (OUTPATIENT)
Dept: FAMILY MEDICINE | Facility: CLINIC | Age: 76
End: 2021-06-01

## 2021-06-01 VITALS — BODY MASS INDEX: 27.13 KG/M2 | HEIGHT: 69 IN | WEIGHT: 183.2 LBS

## 2021-06-01 VITALS — WEIGHT: 182.9 LBS | HEIGHT: 69 IN | BODY MASS INDEX: 27.09 KG/M2

## 2021-06-01 DIAGNOSIS — N18.30 STAGE 3 CHRONIC KIDNEY DISEASE (H): ICD-10-CM

## 2021-06-01 DIAGNOSIS — I10 ESSENTIAL HYPERTENSION: ICD-10-CM

## 2021-06-02 VITALS — BODY MASS INDEX: 26.52 KG/M2 | WEIGHT: 179.56 LBS

## 2021-06-02 VITALS — BODY MASS INDEX: 26.43 KG/M2 | WEIGHT: 179 LBS

## 2021-06-03 ENCOUNTER — RECORDS - HEALTHEAST (OUTPATIENT)
Dept: ADMINISTRATIVE | Facility: CLINIC | Age: 76
End: 2021-06-03

## 2021-06-03 VITALS — HEIGHT: 69 IN | BODY MASS INDEX: 26.36 KG/M2 | WEIGHT: 178 LBS

## 2021-06-04 VITALS
SYSTOLIC BLOOD PRESSURE: 120 MMHG | BODY MASS INDEX: 26.9 KG/M2 | HEIGHT: 69 IN | WEIGHT: 181.6 LBS | DIASTOLIC BLOOD PRESSURE: 62 MMHG | HEART RATE: 64 BPM

## 2021-06-04 VITALS
HEART RATE: 60 BPM | DIASTOLIC BLOOD PRESSURE: 66 MMHG | BODY MASS INDEX: 26.19 KG/M2 | OXYGEN SATURATION: 97 % | SYSTOLIC BLOOD PRESSURE: 116 MMHG | WEIGHT: 174.8 LBS

## 2021-06-04 VITALS
DIASTOLIC BLOOD PRESSURE: 60 MMHG | WEIGHT: 173.9 LBS | SYSTOLIC BLOOD PRESSURE: 126 MMHG | BODY MASS INDEX: 26.06 KG/M2 | HEART RATE: 54 BPM | OXYGEN SATURATION: 97 %

## 2021-06-04 VITALS
DIASTOLIC BLOOD PRESSURE: 60 MMHG | WEIGHT: 180.2 LBS | OXYGEN SATURATION: 97 % | BODY MASS INDEX: 27 KG/M2 | SYSTOLIC BLOOD PRESSURE: 120 MMHG | HEART RATE: 61 BPM

## 2021-06-04 NOTE — TELEPHONE ENCOUNTER
Left message to call back for: results   Information to relay to patient:  Below message. This is in regards to CBC and potassium.

## 2021-06-04 NOTE — TELEPHONE ENCOUNTER
Patient Returning Call  Reason for call:  Patient calling back   Information relayed to patient:  Message below.  Patient has additional questions:  No  If YES, what are your questions/concerns:  Please do not call again, he knows he has an appointment.  Okay to leave a detailed message?: No call back needed

## 2021-06-04 NOTE — TELEPHONE ENCOUNTER
Patient Returning Call  Reason for call:  Message from clinic  Information relayed to patient:  Left message to call back for: results   Information to relay to patient:  Below message. This is in regards to CBC and potassium.   Patient has additional questions:  No  If YES, what are your questions/concerns:  FYI - Patient has scheduled his follow up appointment.  Okay to leave a detailed message?: No call back needed

## 2021-06-04 NOTE — TELEPHONE ENCOUNTER
Left message to call back for: appointment   Information to relay to patient:  Can patient come in to see Dr. Lofton on Monday 12/9/2019 at 8:20 am?

## 2021-06-04 NOTE — TELEPHONE ENCOUNTER
----- Message from Ion Lofton MD sent at 12/10/2019  3:17 PM CST -----  I want to recheck this after his eye surgery; 30 days or so

## 2021-06-04 NOTE — PROGRESS NOTES
Preoperative Exam    Scheduled Procedure: Vitrectomy; insertion of oil bubble   Surgery Date:  12/10/2019  Surgery Location: [facility] Fax: [phone]    Surgeon:  Dr. Cardoza    Assessment/Plan:     1. Preop general physical exam  Patient has been cleared for the anticipated vitrectomy and insertion of gas bubble    Surgical Procedure Risk: Low (reported cardiac risk generally < 1%)  Have you had prior anesthesia?: Yes  Have you or any family members had a previous anesthesia reaction:  No  Do you or any family members have a history of a clotting or bleeding disorder?: No  Cardiac Risk Assessment: no increased risk for major cardiac complications    APPROVAL GIVEN to proceed with proposed procedure, without retinal surgery diagnostic evaluation        Functional Status: Independent  Patient plans to recover at home with family.     Subjective:      Ion Guallpa Jr. is a 74 y.o. male who presents for a preoperative consultation.  Patient is presenting for preoperative clearance for space further retinal surgery.  Patient initially had a gas bubble inserted in his retina following a retinal detachment problem.  Healing was somewhat delayed so patient is scheduled for vitrectomy and for insertion of oil bubble to help retina heal.  This may take a longer period than initially anticipated to restore patient's vision.  Patient is aware the fact that his vision may be a little bit blurry but he is hoping that the inferior field of his left eye will be restored eventually.  Previously the patient was cleared for the initial surgery but he is reached beyond the 30-day limit.  Patient has been therapeutically optimized for the anticipated surgery and has been cleared for any type of conscious sedation or general anesthesia required.  He has no history of chronic kidney disease angina stroke or invasion of body cavities and satisfies the Loo criteria.  Pleasure to see the patient again.  We will  follow-up    All other systems reviewed and are negative, other than those listed in the HPI.    Pertinent History  Do you have difficulty breathing or chest pain after walking up a flight of stairs: No  History of obstructive sleep apnea: No  Steroid use in the last 6 months: No  Frequent Aspirin/NSAID use: Yes: 81 mg aspirin daily  Prior Blood Transfusion: No  Prior Blood Transfusion Reaction: No  If for some reason prior to, during or after the procedure, if it is medically indicated, would you be willing to have a blood transfusion?:  There is no transfusion refusal.    Current Outpatient Medications   Medication Sig Dispense Refill     ascorbic acid, vitamin C, (ASCORBIC ACID WITH MOE HIPS) 500 MG tablet Take 500 mg by mouth daily.       aspirin 81 MG EC tablet Take 81 mg by mouth daily.       lisinopril-hydrochlorothiazide (PRINZIDE,ZESTORETIC) 20-25 mg per tablet TAKE 1 TABLET BY MOUTH DAILY 90 tablet 3     multivit-minerals/FA/lycopene (MEN'S DAILY MULTIVIT-MINERAL ORAL) Take by mouth.       ranitidine (ZANTAC) 150 MG tablet TAKE 1 TABLET BY MOUTH TWICE DAILY 180 tablet 2     No current facility-administered medications for this visit.         No Known Allergies    Patient Active Problem List   Diagnosis     Family history of colon cancer     Detached retina, right     Elevated blood pressure     Overweight (BMI 25.0-29.9)     Normochromic normocytic anemia     Stage 3 chronic kidney disease (H)     Hypertension       Past Medical History:   Diagnosis Date     Cataract        Past Surgical History:   Procedure Laterality Date     EYE SURGERY         Social History     Socioeconomic History     Marital status: Single     Spouse name: Not on file     Number of children: Not on file     Years of education: Not on file     Highest education level: Not on file   Occupational History     Not on file   Social Needs     Financial resource strain: Not on file     Food insecurity:     Worry: Not on file      "Inability: Not on file     Transportation needs:     Medical: Not on file     Non-medical: Not on file   Tobacco Use     Smoking status: Never Smoker     Smokeless tobacco: Never Used   Substance and Sexual Activity     Alcohol use: No     Drug use: No     Sexual activity: Not on file   Lifestyle     Physical activity:     Days per week: Not on file     Minutes per session: Not on file     Stress: Not on file   Relationships     Social connections:     Talks on phone: Not on file     Gets together: Not on file     Attends Anglican service: Not on file     Active member of club or organization: Not on file     Attends meetings of clubs or organizations: Not on file     Relationship status: Not on file     Intimate partner violence:     Fear of current or ex partner: Not on file     Emotionally abused: Not on file     Physically abused: Not on file     Forced sexual activity: Not on file   Other Topics Concern     Not on file   Social History Narrative     Not on file             Objective:     Vitals:    12/09/19 0823   BP: 120/62   Pulse: 64   Weight: 181 lb 9.6 oz (82.4 kg)   Height: 5' 8.5\" (1.74 m)         Physical Exam:  General Appearance:  Alert, cooperative, no distress  Head:  Normocephalic, no obvious abnormality  Ears: TM anatomy normal  Eyes:  PERRL, EOM's intact, conjunctiva and corneas clear  Nose:  Nares symmetrical, septum midline, mucosa pink, no sinus tenderness  Throat:  Lips, tongue, and mucosa are moist, pink, and intact  Neck:  Supple, symmetrical, trachea midline, no adenopathy; thyroid: no enlargement, symmetric,no tenderness/mass/nodules; no carotid bruit, no JVD  Back:  Symmetrical, no curvature, ROM normal, no CVA tenderness  Chest/Breast:  No mass or tenderness  Lungs:  Clear to auscultation bilaterally, respirations unlabored   Heart:  Normal PMI, regular rate & rhythm, S1 and S2 normal, no murmurs, rubs, or gallops  Abdomen:  Soft, non-tender, bowel sounds active all four quadrants, no " mass, or organomegaly  Musculoskeletal:  Tone and strength strong and symmetrical, all extremities  Lymphatic:  No adenopathy  Skin/Hair/Nails:  Skin warm, dry, and intact, no rashes  Neurologic:  Alert and oriented x3, no cranial nerve deficits, normal strength and tone, gait steady  Extremities:  No edema.  Shanell's sign negative.    Genitourinary: deferred  Pulses:  Equal bilatera  Labs:  Labs pending at this time.  Results will be reviewed when available.    Immunization History   Administered Date(s) Administered     ZOSTER, LIVE 06/11/2012           Electronically signed by Ion Lofton MD 12/09/19 8:25 AM

## 2021-06-04 NOTE — TELEPHONE ENCOUNTER
New Appointment Needed  What is the reason for the visit:    Pre-Op Appt Request  When is the surgery? :  12/10/19  Where is the surgery?:   [facility]  Who is the surgeon? :  Dr. Rene polanco/ [facility]  What type of surgery is being done?: repair left eye - retinal detachment   Provider Preference: Any available  How soon do you need to be seen?: prior to 12/10/19  Waitlist offered?:   Okay to leave a detailed message:  Yes - please contact patient directly to schedule this appointment.

## 2021-06-05 NOTE — PROGRESS NOTES
Assessment:     1. Iron deficiency anemia secondary to inadequate dietary iron intake  Iron and Transferrin Iron Binding Capacity    Ferritin    Folate, Serum    Vitamin B12    ferrous sulfate 325 (65 FE) MG tablet   2. Other iron deficiency anemia         Plan:     1. Iron deficiency anemia secondary to inadequate dietary iron intake  We did discuss the importance of to recheck as is he does not eat a lot of leafy green vegetables or greens we suggested kale spinach mandy greens etc. as he wants to stick to a vegetarian diet.  He says he eats red meat once a year.  We did discuss alternatives in his Chinese-based diet and he will conform to those.  I want to check his iron stores as his hemoglobin has dropped 2 points in 6 months without any symptoms  - Iron and Transferrin Iron Binding Capacity  - Ferritin  - Folate, Serum  - Vitamin B12  - ferrous sulfate 325 (65 FE) MG tablet; Take 1 tablet (325 mg total) by mouth daily.  Dispense: 30 tablet; Refill: 3    2. Other iron deficiency anemia  We will consider further work-up or consultation or referral depending on above test results      Subjective:   When Macario was in here for his preoperative exam for his gas bubble insertion into his eye is screening results revealed a hemoglobin of 12 which was down from his usual level of 14 which we have been checking usually twice a year.  He states he has not noticed any bleeding or dark stools.  He says his energy level is down but he has been a little bit depressed about the problems with his left eye.  He formerly was a very active daily swimmer and still keeps in good shape but does not actively swim anymore.  He states his energy level is probably about the same but on closer questioning I feel he is off his game a little bit.  We did discuss the fact that the hemoglobin can drop with normal aging as bone marrow also ages.  I am going to do a further work-up to make sure that we do not have some hidden unanticipated  problem.  I am willing to prescribe some ferrous sulfate for him on a daily basis because after reviewing his diet he hardly eats any red meat and is a single person and does not cook a lot of green vegetables or consume them on a daily basis.  We will see what we will see.  Follow-up in 90 days repeat hemogram.    Review of Systems: A complete 14 point review of systems was obtained and is negative or as stated in the history of present illness.    Past Medical History:   Diagnosis Date     Cataract      Family History   Problem Relation Age of Onset     Cancer Father         lung     Cancer Brother         colon CA with mets to liver     Past Surgical History:   Procedure Laterality Date     EYE SURGERY       Social History     Tobacco Use     Smoking status: Never Smoker     Smokeless tobacco: Never Used   Substance Use Topics     Alcohol use: No     Drug use: No         Objective:   /60 (Patient Site: Right Arm, Patient Position: Sitting, Cuff Size: Adult Regular)   Pulse 61   Wt 180 lb 3.2 oz (81.7 kg)   SpO2 97%   BMI 27.00 kg/m      General Appearance:  Alert, cooperative, no distress  Head:  Normocephalic, no obvious abnormality  Ears: TM anatomy normal  Eyes:  PERRL, EOM's intact, conjunctiva and corneas clear  Nose:  Nares symmetrical, septum midline, mucosa pink, no sinus tenderness  Throat:  Lips, tongue, and mucosa are moist, pink, and intact  Neck:  Supple, symmetrical, trachea midline, no adenopathy; thyroid: no enlargement, symmetric,no tenderness/mass/nodules; no carotid bruit, no JVD  Back:  Symmetrical, no curvature, ROM normal, no CVA tenderness  Chest/Breast:  No mass or tenderness  Lungs:  Clear to auscultation bilaterally, respirations unlabored   Heart:  Normal PMI, regular rate & rhythm, S1 and S2 normal, no murmurs, rubs, or gallops  Abdomen:  Soft, non-tender, bowel sounds active all four quadrants, no mass, or organomegaly  Musculoskeletal:  Tone and strength strong and  symmetrical, all extremities  Lymphatic:  No adenopathy  Skin/Hair/Nails:  Skin warm, dry, and intact, no rashes  Neurologic:  Alert and oriented x3, no cranial nerve deficits, normal strength and tone, gait steady  Extremities:  No edema.  Shanell's sign negative.    Genitourinary: deferred  Pulses:  Equal bilaterally     I have had an Advance Directives discussion with the patient.      This note has been dictated using voice recognition software. Any grammatical or context distortions are unintentional and inherent to the the software.

## 2021-06-07 NOTE — PROGRESS NOTES
"Ion Guallpa Jr. is a 74 y.o. male who is being evaluated via a billable telephone visit.      The patient has been notified of following:     \"This telephone visit will be conducted via a call between you and your physician/provider. We have found that certain health care needs can be provided without the need for a physical exam.  This service lets us provide the care you need with a short phone conversation.  If a prescription is necessary we can send it directly to your pharmacy.  If lab work is needed we can place an order for that and you can then stop by our lab to have the test done at a later time.    Telephone visits are billed at different rates depending on your insurance coverage. During this emergency period, for some insurers they may be billed the same as an in-person visit.  Please reach out to your insurance provider with any questions.    If during the course of the call the physician/provider feels a telephone visit is not appropriate, you will not be charged for this service.\"    Patient has given verbal consent to a Telephone visit? Yes    Patient would like to receive their AVS by AVS Preference: Mail a copy.    Additional provider notes: Ion is being visited via telephone visit today and the visit took about 12 minutes.  He has had a past medical history of iron deficiency anemia and benign essential hypertension blood pressure has been good he has been checking it on the outside getting readings in the high 128 sent systolic and diastolics in the 80s.  He is being a lot of leafy green vegetables and some meat although it is difficult for him because he is essentially a vegetarian but he has been eating some red meat once a week in addition to fish another iron rich products including eggs.  He sounds very  and is back to his sweaty [place] but I do recall that outbreak.  He denies any black stools  When he was taking his iron I will refill his iron today and his blood " pressure pills remaining days all medical questions asked were answered I personally reviewed family social history surgeries allergies problems list.  His rectal surgery was postponed will catch up with in a month for his preop exam was done at that time we will do a hemogram.    Assessment/Plan:  1. Iron deficiency anemia secondary to inadequate dietary iron intake  Renew iron pills  2.  Benign essential hypertension  - Renew lisinopril hydrochlorothiazide        Phone call duration:  12 minutes    Debora Simpson MA

## 2021-06-07 NOTE — TELEPHONE ENCOUNTER
Who is calling:  Patient  Reason for Call:  Spoke to patient, and he stated the surgery is still on and he will call if they decide to cancel or postpone.  Date of last appointment with primary care: 1/13/2020  Okay to leave a detailed message: Yes

## 2021-06-08 NOTE — TELEPHONE ENCOUNTER
New Appointment Needed  What is the reason for the visit:    Pre-Op Appt Request  When is the surgery? :  5/19/20  Where is the surgery?:   [facility]  Who is the surgeon? :  Dr. Cardoza  What type of surgery is being done?: Eye Surgery  Provider Preference: PCP only  How soon do you need to be seen?: As soon as possible  Waitlist offered?: No  Okay to leave a detailed message:  Yes

## 2021-06-08 NOTE — TELEPHONE ENCOUNTER
Reviewed patient's chart, noted that he is scheduled for LEFT VITRECTOMY POSTERIOR 25 GAUGE SYSTEM, REMOVAL OF SILICONE OIL, LASER, GAS FLUID EXCHANGE at [facility]    Called patient to advise him to have the COVID-19 testing performed by the [facility].    The COVID-19 testing should be completed 48-72 hours prior to scheduled procedure. Too soon for test to be completed.

## 2021-06-08 NOTE — PROGRESS NOTES
Preoperative Exam    Scheduled Procedure: Vitrectomy  Surgery Date:  5/19/2020  Surgery Location: [facility]  Surgeon:  Dr. Cardoza     Assessment/Plan:     1. Preop general physical exam  Work-up to include the following  - HM1(CBC and Differential)  - Potassium  - Electrocardiogram Perform - Clinic  - HM1 (CBC with Diff)    2. Gastroesophageal reflux disease with esophagitis  Change of medication from ranitidine to the following  - famotidine (PEPCID) 20 MG tablet; Take 1 tablet (20 mg total) by mouth 2 (two) times a day.  Dispense: 60 tablet; Refill: 1     Surgical Procedure Risk: Low (reported cardiac risk generally < 1%)  Have you had prior anesthesia?: Yes  Have you or any family members had a previous anesthesia reaction:  No  Do you or any family members have a history of a clotting or bleeding disorder?: No  Cardiac Risk Assessment: no increased risk for major cardiac complications    APPROVAL GIVEN to proceed with proposed procedure, without further diagnostic evaluation        Functional Status: Independent  Patient plans to recover at home with family.     Subjective:      Ion Guallpa Jr. is a 74 y.o. male who presents for a preoperative consultation.  Patient is having a vitrectomy under the direction of Dr. Beltran.  Patient has had a gas bubble in his left eye which is scheduled to be removed at the same time.  Patient has had cataract surgery in the past.  Patient has had anesthesia in the past without any difficulty.  He has had mild iron deficiency anemia which we are treating dietarily.  System review unremarkable.  Patient has mild GERD were substituting Pepcid for Zantac today.  Patient will return for annual wellness exam in August.  EKG is always showed sinus bradycardia the patient is an avid swimmer and cardiovascular wise is stable.  Patient is been therapeutically optimized for the anticipated surgery including the anesthesia both local and intravenous sedation if necessary.  All  medical questions asked were answered I personally reviewed family social history surgeries allergies problems list    All other systems reviewed and are negative, other than those listed in the HPI.    Pertinent History  Do you have difficulty breathing or chest pain after walking up a flight of stairs: No  History of obstructive sleep apnea: No  Steroid use in the last 6 months: No  Frequent Aspirin/NSAID use: Yes: Takes baby aspirin daily  Prior Blood Transfusion: No  Prior Blood Transfusion Reaction: No  If for some reason prior to, during or after the procedure, if it is medically indicated, would you be willing to have a blood transfusion?:  There is no transfusion refusal.    Current Outpatient Medications   Medication Sig Dispense Refill     allopurinol (ZYLOPRIM) 100 MG tablet Take 100 mg by mouth 2 (two) times a day.       ascorbic acid, vitamin C, (ASCORBIC ACID WITH MOE HIPS) 500 MG tablet Take 500 mg by mouth daily.       aspirin 81 MG EC tablet Take 81 mg by mouth daily.       ferrous sulfate 325 (65 FE) MG tablet Take 1 tablet (325 mg total) by mouth daily. 30 tablet 3     lisinopriL-hydrochlorothiazide (PRINZIDE,ZESTORETIC) 20-25 mg per tablet Take 1 tablet by mouth daily. 90 tablet 3     multivit-minerals/FA/lycopene (MEN'S DAILY MULTIVIT-MINERAL ORAL) Take by mouth.       ranitidine (ZANTAC) 150 MG tablet TAKE 1 TABLET BY MOUTH TWICE DAILY 180 tablet 2     No current facility-administered medications for this visit.         No Known Allergies    Patient Active Problem List   Diagnosis     Family history of colon cancer     Detached retina, right     Elevated blood pressure     Overweight (BMI 25.0-29.9)     Normochromic normocytic anemia     Stage 3 chronic kidney disease (H)     Hypertension       Past Medical History:   Diagnosis Date     Cataract        Past Surgical History:   Procedure Laterality Date     EYE SURGERY         Social History     Socioeconomic History     Marital status: Single      Spouse name: Not on file     Number of children: Not on file     Years of education: Not on file     Highest education level: Not on file   Occupational History     Not on file   Social Needs     Financial resource strain: Not on file     Food insecurity     Worry: Not on file     Inability: Not on file     Transportation needs     Medical: Not on file     Non-medical: Not on file   Tobacco Use     Smoking status: Never Smoker     Smokeless tobacco: Never Used   Substance and Sexual Activity     Alcohol use: No     Drug use: No     Sexual activity: Not on file   Lifestyle     Physical activity     Days per week: Not on file     Minutes per session: Not on file     Stress: Not on file   Relationships     Social connections     Talks on phone: Not on file     Gets together: Not on file     Attends Synagogue service: Not on file     Active member of club or organization: Not on file     Attends meetings of clubs or organizations: Not on file     Relationship status: Not on file     Intimate partner violence     Fear of current or ex partner: Not on file     Emotionally abused: Not on file     Physically abused: Not on file     Forced sexual activity: Not on file   Other Topics Concern     Not on file   Social History Narrative     Not on file             Objective:     Vitals:    05/18/20 1255   BP: 116/66   Pulse: 60   SpO2: 97%   Weight: 174 lb 12.8 oz (79.3 kg)         Physical Exam:  General Appearance:  Alert, cooperative, no distress  Head:  Normocephalic, no obvious abnormality  Ears: TM anatomy normal  Eyes:  PERRL, EOM's intact, conjunctiva and corneas clear  Nose:  Nares symmetrical, septum midline, mucosa pink, no sinus tenderness  Throat:  Lips, tongue, and mucosa are moist, pink, and intact  Neck:  Supple, symmetrical, trachea midline, no adenopathy; thyroid: no enlargement, symmetric,no tenderness/mass/nodules; no carotid bruit, no JVD  Back:  Symmetrical, no curvature, ROM normal, no CVA  tenderness  Chest/Breast:  No mass or tenderness  Lungs:  Clear to auscultation bilaterally, respirations unlabored   Heart:  Normal PMI, regular rate & rhythm, S1 and S2 normal, no murmurs, rubs, or gallops  Abdomen:  Soft, non-tender, bowel sounds active all four quadrants, no mass, or organomegaly  Musculoskeletal:  Tone and strength strong and symmetrical, all extremities  Lymphatic:  No adenopathy  Skin/Hair/Nails:  Skin warm, dry, and intact, no rashes  Neurologic:  Alert and oriented x3, no cranial nerve deficits, normal strength and tone, gait steady  Extremities:  No edema.  Shanell's sign negative.    Genitourinary: deferred  Pulses:  Equal bilaterally    EKG shows sinus bradycardia but no acute abnormalities    Labs:  Labs pending at this time.  Results will be reviewed when available.    Immunization History   Administered Date(s) Administered     ZOSTER, LIVE 06/11/2012           Electronically signed by Ion Lofton MD 05/18/20 12:59 PM

## 2021-06-08 NOTE — TELEPHONE ENCOUNTER
RN Triage:    Ion is scheduled for surgery at [facility] on 5/19/20.  Had pre-surgical covid-19 screening done today.    Surgeon's office is requesting two things:    1. Please fax covid-19 test result, which was done today, to [facility] at: [phone], Attention Dr. Cardoza.    2.  Please also mail a hard copy of covid-19 test result to Dr. Cardoza at [facility].    Cori Preston RN  [facility]

## 2021-06-10 NOTE — PROGRESS NOTES
CC: I am here for my checkup; I feel well; I need my medications renewed    HPI: Ion is been under my care for many years.  I have known him since our childhood.  He suffers from benign essential hypertension and stage III kidney disease and is under the care of nephrology who see him on an annual basis.  We keep an eye on his BUN and creatinine.  He has had some visual problems or in the last year including some retinal problems which have been fixed by gas bubble surgery and he is seeing well again and doing very well.  He has had an iron deficiency anemia which we have treated with ferrous sulfate and increasing his meat and eggs in his diet he is really done very well.  Feels much better he is back to swimming again.  He had to give up aerobic swimming which he did on a daily basis but he is very happy that he is doing that again.  He is on lisinopril hydrochlorothiazide he is on allopurinol for chronic gout.  He does take a baby aspirin.  EKG has been reviewed from this year prior to surgeries.  He denies any hearing loss dysphasia shortness of breath dyspnea chest pain angina abdominal pain diarrhea constipation urgency frequency dysuria plan here is to do a usual comprehensive profile hemogram urine and work with him to keep up his excellent health profile and his specialists referrals.  All medical questions asked were answered I have personally reviewed family social history surgeries allergies problems list.      Review of Systems: A complete 14 point review of systems was obtained and is negative or as stated in the history of present illness.    Past Medical History:   Diagnosis Date     Cataract      Family History   Problem Relation Age of Onset     Cancer Father         lung     Cancer Brother         colon CA with mets to liver     Past Surgical History:   Procedure Laterality Date     EYE SURGERY       Social History     Tobacco Use     Smoking status: Never Smoker     Smokeless tobacco: Never  Used   Substance Use Topics     Alcohol use: No     Drug use: No       PE:   /60 (Patient Site: Left Arm, Patient Position: Sitting, Cuff Size: Adult Regular)   Pulse (!) 54   Wt 173 lb 14.4 oz (78.9 kg)   SpO2 97%   BMI 26.06 kg/m       General Appearance:  Alert, cooperative, no distress  Head:  Normocephalic, no obvious abnormality  Ears: TM anatomy normal  Eyes:  PERRL, EOM's intact, conjunctiva and corneas clear  Nose:  Nares symmetrical, septum midline, mucosa pink, no sinus tenderness  Throat:  Lips, tongue, and mucosa are moist, pink, and intact  Neck:  Supple, symmetrical, trachea midline, no adenopathy; thyroid: no enlargement, symmetric,no tenderness/mass/nodules; no carotid bruit, no JVD  Back:  Symmetrical, no curvature, ROM normal, no CVA tenderness  Chest/Breast:  No mass or tenderness  Lungs:  Clear to auscultation bilaterally, respirations unlabored   Heart:  Normal PMI, regular rate & rhythm, S1 and S2 normal, no murmurs, rubs, or gallops  Abdomen:  Soft, non-tender, bowel sounds active all four quadrants, no mass, or organomegaly large right inguinal hernia remains asymptomatic  Musculoskeletal:  Tone and strength strong and symmetrical, all extremities  Lymphatic:  No adenopathy  Skin/Hair/Nails:  Skin warm, dry, and intact, no rashes  Neurologic:  Alert and oriented x3, no cranial nerve deficits, normal strength and tone, gait steady  Extremities:  No edema.  Shanell's sign negative.    Genitourinary: Prostate small at 40 mL no external hemorrhoids.  Pulses:  Equal bilaterally    Impression:     1. Encounter for Medicare annual wellness exam     2. Essential hypertension     3. Stage 3 chronic kidney disease (H)     4. Anemia due to stage 3 chronic kidney disease (H)          PLAN:   1. Encounter for Medicare annual wellness exam  Work-up to include laboratory comprehensive hemogram urine    2. Essential hypertension  Continue lisinopril hydrochlorothiazide    3. Stage 3 chronic kidney  disease (H)  Follow-up with nephrology    4. Anemia due to stage 3 chronic kidney disease (H)  Recheck hemogram continue iron      I have had an Advance Directives discussion with the patient.        This note has been dictated using voice recognition software. Any grammatical or context distortions are unintentional and inherent to the the software.

## 2021-06-10 NOTE — PROGRESS NOTES
Assessment and Plan:   Patient will need a referral to colorectal surgery because of the mass felt on digital examination  which is suspicious.    1. Routine general medical examination at a health care facility  Workup to include the following  - Comprehensive Metabolic Panel  - Electrocardiogram Perform - Clinic  - Lipid [place]  - PSA (Prostatic-Specific Antigen), Annual Screen  - Thyroid Cascade  - Urinalysis-UC if Indicated  - Vitamin D, Total (25-Hydroxy)  - HM1(CBC and Differential)  - HM1 (CBC with Diff)    2. Essential hypertension  Patient is to continue with his lisinopril hydrochlorothiazide  - Comprehensive Metabolic Panel  - Electrocardiogram Perform - Clinic  - Lipid [place]    3. Chronic kidney disease (CKD), stage 3 (moderate)  Workup as follows  - Comprehensive Metabolic Panel  - PSA (Prostatic-Specific Antigen), Annual Screen    4. Normochromic normocytic anemia  Hemoglobin needs to be checked  - HM1(CBC and Differential)  - HM1 (CBC with Diff)    5. Loss of taste  Patient will be started on the following daily for 2 months  - fluticasone (FLONASE) 50 mcg/actuation nasal spray; 1 spray into each nostril daily.  Dispense: 16 g; Refill: 2    6. Inguinal hernia  Patient did not follow through with his inguinal hernia surgery.  I did recommend that he should do so    7. Rectal mass  Suspicious examination I felt a rectal fullness which is different from previous examination.  Patient is overdue for colonoscopy appointment has been recommended and scheduled  - Ambulatory referral for Colonoscopy      The patient's current medical problems were reviewed.      The following health maintenance schedule was reviewed with the patient and provided in printed form in the after visit summary:   Health Maintenance   Topic Date Due     TD 18+ HE  09/30/1963     ZOSTER VACCINE  09/30/2003     PNEUMOCOCCAL POLYSACCHARIDE VACCINE AGE 65 AND OVER  09/30/2008     PNEUMOCOCCAL CONJUGATE VACCINE FOR ADULTS (PCV13 OR  PREVNAR)  09/30/2008     COLONOSCOPY  01/01/2016     INFLUENZA VACCINE RULE BASED (Season Ended) 08/01/2017     FALL RISK ASSESSMENT  05/19/2018     ADVANCE DIRECTIVES DISCUSSED WITH PATIENT  04/13/2021        Subjective:   Chief Complaint: Ion Guallpa is an 71 y.o. male here for an Annual Wellness visit.   HPI: Patient is being seen for annual wellness exam.  He has lost some weight in the last year.  He is overdue for colonoscopy.  Patient has benign essential hypertension which is well-controlled on lisinopril hydrochlorothiazide patient states his appetite is way off because his taste of smell and generalized taste are decreased.  He notes he enjoys fish but the taste like absolutely nothing.  Patient also has backed off on his food intake because he cannot taste them.  Previously he had a large right inguinal hernia which he did not repair because of some television ads about the mesh which scared him off.  He says the large right inguinal hernia does interfere with his acts of daily living but it is large and I do recommend that he get it reevaluated.  He denies any syncopal episodes visual problems hearing problems he has had a detached retina in the past no dysphasia no shortness of breath dyspnea chest pain angina no abdominal pain he notes no melena no hematochezia.  Today's exam I do feel a rectal fullness within the reach of the examining finger which I feel needs to be evaluated.  Patient's cognitive screening was grossly normal but I feel he is a little more forgetful than the last time that I saw him.  We will make sure he is not anemic and that this rectal fullness does not amount to anything.  Medical decision making was to start the patient on a inhaled nasal corticosteroid on a daily basis for 2 months I will continue him on his current medications.  Will follow him with her consultants.  I went over things several times with the patient and I feels he understands where we are going with this  "workup.  All medical questions were asked and answered several times.    Review of Systems: Negative except as mentioned in HPI please see above.  The rest of the review of systems are negative for all systems.    Patient Care Team:  Ion Lofton MD as PCP - General (Family Medicine)     Patient Active Problem List   Diagnosis     Family history of colon cancer     Detached retina, right     Elevated blood pressure     Overweight (BMI 25.0-29.9)     Normochromic normocytic anemia     Chronic kidney disease (CKD), stage 3 (moderate)     Hypertension     Past Medical History:   Diagnosis Date     Cataract       Past Surgical History:   Procedure Laterality Date     EYE SURGERY        Family History   Problem Relation Age of Onset     Cancer Father      lung     Cancer Brother      colon CA with mets to liver      Social History     Social History     Marital status: Single     Spouse name: N/A     Number of children: N/A     Years of education: N/A     Occupational History     Not on file.     Social History Main Topics     Smoking status: Never Smoker     Smokeless tobacco: Never Used     Alcohol use No     Drug use: No     Sexual activity: Not on file     Other Topics Concern     Not on file     Social History Narrative      Current Outpatient Prescriptions   Medication Sig Dispense Refill     aspirin 81 MG EC tablet Take 81 mg by mouth daily.       fluticasone (FLONASE) 50 mcg/actuation nasal spray 1 spray into each nostril daily. 16 g 2     lisinopril-hydrochlorothiazide (PRINZIDE,ZESTORETIC) 20-25 mg per tablet TAKE 1 TABLET BY MOUTH DAILY 90 tablet 0     No current facility-administered medications for this visit.       Objective:   Vital Signs:   Visit Vitals     /78     Pulse 63     Ht 5' 9\" (1.753 m)     Wt 171 lb (77.6 kg)     SpO2 99%     BMI 25.25 kg/m2    General Appearance:  Alert, cooperative, no distress  Head:  Normocephalic, no obvious abnormality  Ears: TM anatomy normal  Eyes:  PERRL, EOM's " intact, conjunctiva and corneas clear  Nose:  Nares symmetrical, septum midline, mucosa pink, no sinus tenderness  Throat:  Lips, tongue, and mucosa are moist, pink, and intact  Neck:  Supple, symmetrical, trachea midline, no adenopathy; thyroid: no enlargement, symmetric,no tenderness/mass/nodules; no carotid bruit, no JVD  Back:  Symmetrical, no curvature, ROM normal, no CVA tenderness  Chest/Breast:  No mass or tenderness  Lungs:  Clear to auscultation bilaterally, respirations unlabored   Heart:  Normal PMI, regular rate & rhythm, S1 and S2 normal, no murmurs, rubs, or gallops  Abdomen:  Soft, non-tender, bowel sounds active all four quadrants, no mass, or organomegaly  Musculoskeletal:  Tone and strength strong and symmetrical, all extremities  Lymphatic:  No adenopathy  Skin/Hair/Nails:  Skin warm, dry, and intact, no rashes  Neurologic:  Alert and oriented x3, no cranial nerve deficits, normal strength and tone, gait steady  Extremities:  No edema.  Shanell's sign negative.    Genitourinary: deferred  Pulses:  Equal bilaterally    VisionScreening:  No exam data present     PHYSICAL EXAM  General Appearance:  Alert, cooperative, no distress  Head:  Normocephalic, no obvious abnormality  Ears: TM anatomy normal  Eyes:  PERRL, EOM's intact, conjunctiva and corneas clear  Nose:  Nares symmetrical, septum midline, mucosa pink, no sinus tenderness mucosa  Throat:  Lips, tongue, and mucosa are moist, pink, and intact  Neck:  Supple, symmetrical, trachea midline, no adenopathy; thyroid: no enlargement, symmetric,no tenderness/mass/nodules; no carotid bruit, no JVD  Back:  Symmetrical, no curvature, ROM normal, no CVA tenderness  Chest/Breast:  No mass or tenderness  Lungs:  Clear to auscultation bilaterally, respirations unlabored   Heart:  Normal PMI, regular rate & rhythm, S1 and S2 normal, no murmurs, rubs, or gallops EKG was reviewed  Abdomen:  Soft, non-tender, bowel sounds active all four quadrants, no mass, or  organomegaly rectal exam shows a fullness at the tip of the digital examining finger further workup is necessary boggy right nasal  Musculoskeletal:  Tone and strength strong and symmetrical, all extremities  Lymphatic:  No adenopathy  Skin/Hair/Nails:  Skin warm, dry, and intact, no rashes  Neurologic:  Alert and oriented x3, no cranial nerve deficits, normal strength and tone, gait steady  Extremities:  No edema.  Shanell's sign negative.    Genitourinary: deferred  Pulses:  Equal bilaterally    Assessment Results 5/19/2017   Activities of Daily Living No help needed   Instrumental Activities of Daily Living No help needed     A Mini-Cog score of 0-2 suggests the possibility of dementia, score of 3-5 suggests no dementia    Identified Health Risks:     Information regarding advance directives (living [place]), including where he can download the appropriate form, was provided to the patient via the AVS.

## 2021-06-11 NOTE — PROGRESS NOTES
Assessment:     1. Diverticulitis         Plan:     1. Diverticulitis  Patient will go on a high-fiber diet and we discussed this at length his colonoscopy was also discussed including the polypectomy of a benign polyp      Subjective:   Patient here returns we discussed all of his laboratory findings which indicated a mild anemia.  I told the patient it is acceptable for him to eat meat once or twice per month as it does contain iron.  The patient likes to eat a lot of fish.  We did discuss high-fiber diet I also included that he should eat some spinach and some grains on a regular basis the diet is to include oranges bananas almonds walnuts.  Patient understands he feels much better now that the colonoscopy has been completed a large benign polyp was removed patient has a strong family history of colon cancer father and brother.  He denies any other constitutional complaints at this time no visual problems hearing problems dysphagia shortness of breath dyspnea chest pain no urinary tract symptoms.  I will see the patient for follow-up 6 weeks I personally reviewed family social history surgeries allergies with the patient we will recheck his comprehensive profile hemogram 6 months    Review of Systems: A complete 14 point review of systems was obtained and is negative or as stated in the history of present illness.    Past Medical History:   Diagnosis Date     Cataract      Family History   Problem Relation Age of Onset     Cancer Father      lung     Cancer Brother      colon CA with mets to liver     Past Surgical History:   Procedure Laterality Date     EYE SURGERY       Social History   Substance Use Topics     Smoking status: Never Smoker     Smokeless tobacco: Never Used     Alcohol use No         Objective:   /70  Pulse 62  Wt 170 lb (77.1 kg)  SpO2 98%  BMI 25.1 kg/m2    General Appearance:  Alert, cooperative, no distress  Head:  Normocephalic, no obvious abnormality  Ears: TM anatomy  normal  Eyes:  PERRL, EOM's intact, conjunctiva and corneas clear  Nose:  Nares symmetrical, septum midline, mucosa pink, no sinus tenderness  Throat:  Lips, tongue, and mucosa are moist, pink, and intact  Neck:  Supple, symmetrical, trachea midline, no adenopathy; thyroid: no enlargement, symmetric,no tenderness/mass/nodules; no carotid bruit, no JVD  Back:  Symmetrical, no curvature, ROM normal, no CVA tenderness  Chest/Breast:  No mass or tenderness  Lungs:  Clear to auscultation bilaterally, respirations unlabored   Heart:  Normal PMI, regular rate & rhythm, S1 and S2 normal, no murmurs, rubs, or gallops  Abdomen:  Soft, non-tender, bowel sounds active all four quadrants, no mass, or organomegaly  Musculoskeletal:  Tone and strength strong and symmetrical, all extremities  Lymphatic:  No adenopathy  Skin/Hair/Nails:  Skin warm, dry, and intact, no rashes  Neurologic:  Alert and oriented x3, no cranial nerve deficits, normal strength and tone, gait steady  Extremities:  No edema.  Shanell's sign negative.    Genitourinary: deferred  Pulses:  Equal bilaterally     The following high BMI interventions were performed this visit: weight monitoring      This note has been dictated using voice recognition software. Any grammatical or context distortions are unintentional and inherent to the the software.

## 2021-06-12 NOTE — TELEPHONE ENCOUNTER
Refill Approved    Rx renewed per Medication Renewal Policy. Medication was last renewed on 5/18/20.    Chen Bentley, [facility] Triage/Med Refill 10/6/2020     Requested Prescriptions   Pending Prescriptions Disp Refills     famotidine (PEPCID) 20 MG tablet [Pharmacy Med Name: FAMOTIDINE 20MG TABLETS] 60 tablet 1     Sig: TAKE 1 TABLET(20 MG) BY MOUTH TWICE DAILY       GI Medications Refill Protocol Passed - 10/3/2020  5:05 PM        Passed - PCP or prescribing provider visit in last 12 or next 3 months.     Last office visit with prescriber/PCP: 1/13/2020 Ion Lofton MD OR same dept: 1/13/2020 Ion Lofton MD OR same specialty: 1/13/2020 Ion Lofton MD  Last physical: 8/13/2020 Last MTM visit: Visit date not found   Next visit within 3 mo: Visit date not found  Next physical within 3 mo: Visit date not found  Prescriber OR PCP: Ion Lofton MD  Last diagnosis associated with med order: 1. Gastroesophageal reflux disease with esophagitis  - famotidine (PEPCID) 20 MG tablet [Pharmacy Med Name: FAMOTIDINE 20MG TABLETS]; TAKE 1 TABLET(20 MG) BY MOUTH TWICE DAILY  Dispense: 60 tablet; Refill: 1    If protocol passes may refill for 12 months if within 3 months of last provider visit (or a total of 15 months).

## 2021-06-12 NOTE — PROGRESS NOTES
Assessment:     1. Sinusitis  Ambulatory referral to ENT    CT Sinuses Without Contrast    CANCELED: Ambulatory referral to ENT   2. Rhinorrhea         Plan:     1. Sinusitis  Patient has had compromise of his senses of smell and taste as well as daily congestive headaches in the area of the paranasal sinuses daily rhinorrhea  - Ambulatory referral to ENT  - CT Sinuses Without Contrast; Future    2. Rhinorrhea  No response from Mucinex vitamin C and hydration and daily use of Flomax referral indicated      Subjective:   This 71-year-old patient has been experiencing daily headaches in the frontal scalp and the paranasal area as well as loss of the sense of smell and the sense of taste particularly to certain favorite Chinese and spicy foods that he normally enjoys.  He has tried over-the-counter remedies including prescription Flonase for relief of what he calls sinus congestion.  His headaches are usually there in the morning and improve after he blows his nose or hydrates himself.  He rates the headaches as a 2-3 out of 10.  Patient is taking vitamin C in addition to a daily vitamin and leads a healthy lifestyle including daily swimming he swims up to a mile daily but because of difficulty breathing out of his nose he is now back down to a half a mile.  Quality of life is not what it was mainly based on his breathing and sinus problems.  Referral to ear nose and throat is indicated.  Possibility of allergic external factors also mentioned to patient.  Plan here is CT scan of sinuses his laboratory is up-to-date.  We will refer him out order a CT scan observe the ENT referral possibility of allergy workup also exists and patient is aware of this.  He denies other constitutional complaints no visual disturbances hearing is excellent no vestibular complaints no dysphagia shortness of breath dyspnea chest pain abdominal pain difficulty with gait.  Follow-up as necessary he will call me to let me know results of  "referral.    Review of Systems: A complete 14 point review of systems was obtained and is negative or as stated in the history of present illness.    Past Medical History:   Diagnosis Date     Cataract      Family History   Problem Relation Age of Onset     Cancer Father      lung     Cancer Brother      colon CA with mets to liver     Past Surgical History:   Procedure Laterality Date     EYE SURGERY       Social History   Substance Use Topics     Smoking status: Never Smoker     Smokeless tobacco: Never Used     Alcohol use No         Objective:   /70  Pulse (!) 57  Ht 5' 9\" (1.753 m)  Wt 174 lb (78.9 kg)  SpO2 96%  BMI 25.7 kg/m2    General Appearance:  Alert, cooperative, no distress  Head:  Normocephalic, no obvious abnormality  Ears: TM anatomy normal  Eyes:  PERRL, EOM's intact, conjunctiva and corneas clear  Nose:  Nares symmetrical, septum midline, mucosa pink, no sinus tenderness  Throat:  Lips, tongue, and mucosa are moist, pink, and intact  Neck:  Supple, symmetrical, trachea midline, no adenopathy; thyroid: no enlargement, symmetric,no tenderness/mass/nodules; no carotid bruit, no JVD  Back:  Symmetrical, no curvature, ROM normal, no CVA tenderness  Chest/Breast:  No mass or tenderness  Lungs:  Clear to auscultation bilaterally, respirations unlabored   Heart:  Normal PMI, regular rate & rhythm, S1 and S2 normal, no murmurs, rubs, or gallops  Abdomen:  Soft, non-tender, bowel sounds active all four quadrants, no mass, or organomegaly  Musculoskeletal:  Tone and strength strong and symmetrical, all extremities  Lymphatic:  No adenopathy  Skin/Hair/Nails:  Skin warm, dry, and intact, no rashes  Neurologic:  Alert and oriented x3, no cranial nerve deficits, normal strength and tone, gait steady  Extremities:  No edema.  Shanell's sign negative.    Genitourinary: deferred  Pulses:  Equal bilaterally     I have had an Advance Directives discussion with the patient.      This note has been dictated " using voice recognition software. Any grammatical or context distortions are unintentional and inherent to the the software.

## 2021-06-14 NOTE — PROGRESS NOTES
Assessment:     1. GERD (gastroesophageal reflux disease)  ranitidine (ZANTAC) 150 MG tablet   2. Essential hypertension         Plan:     1. GERD (gastroesophageal reflux disease)  Patient has been having symptomatic GERD for some time now we will treat him with the following H2 blocker which should also help his loss of taste of smell which is slowly improving  - ranitidine (ZANTAC) 150 MG tablet; Take 1 tablet (150 mg total) by mouth at bedtime.  Dispense: 60 tablet; Refill: 1    2. Essential hypertension  Patient is to continue to take his lisinopril hydrochlorothiazide      Subjective:   Ion returns here for follow-up.  We initially saw him for problems with loss of smell and loss of taste.  He was referred to ear nose and throat.  Workup there was essentially negative.  The patient was placed on Flonase he has had slow improvement as a matter fact his taste and smell have almost returned to normal.  Patient however is describing some acid reflux symptoms which have been familial in origin as his father suffered from it is been increasing lately he has been suffering now for 6-8 months postprandial discomfort and some acid splash at night.  Pros and cons of therapy discussed medical decision-making was to treat the patient with an H2 blocker which also may help his loss of taste of smell and taste with his antihistamine effects.  Certainly is worth a trial.  I will prescribe 150 mg at bedtime we will see if he gets symptomatic improvement.  If his GERD fails to respond I will see him sooner but I will see him again in the spring at which time we may consider a PPI form of therapy for him.  Patient otherwise feels well denies any headaches he certainly can hear no dysphasia shortness of breath dyspnea chest pain other than GERD symptoms no abdominal pain diarrhea constipation urgency frequency patient swims on a daily basis and we encouraged him to continue his healthy lifestyle habits.  Was a pleasure to see  the patient.    Review of Systems: A complete 14 point review of systems was obtained and is negative or as stated in the history of present illness.    Past Medical History:   Diagnosis Date     Cataract      Family History   Problem Relation Age of Onset     Cancer Father      lung     Cancer Brother      colon CA with mets to liver     Past Surgical History:   Procedure Laterality Date     EYE SURGERY       Social History   Substance Use Topics     Smoking status: Never Smoker     Smokeless tobacco: Never Used     Alcohol use No         Objective:   /62    General Appearance:  Alert, cooperative, no distress  Head:  Normocephalic, no obvious abnormality  Ears: TM anatomy normal  Eyes:  PERRL, EOM's intact, conjunctiva and corneas clear  Nose:  Nares symmetrical, septum midline, mucosa pink, no sinus tenderness  Throat:  Lips, tongue, and mucosa are moist, pink, and intact  Neck:  Supple, symmetrical, trachea midline, no adenopathy; thyroid: no enlargement, symmetric,no tenderness/mass/nodules; no carotid bruit, no JVD  Back:  Symmetrical, no curvature, ROM normal, no CVA tenderness  Chest/Breast:  No mass or tenderness  Lungs:  Clear to auscultation bilaterally, respirations unlabored   Heart:  Normal PMI, regular rate & rhythm, S1 and S2 normal, no murmurs, rubs, or gallops  Abdomen:  Soft, non-tender, bowel sounds active all four quadrants, no mass, or organomegaly  Musculoskeletal:  Tone and strength strong and symmetrical, all extremities  Lymphatic:  No adenopathy  Skin/Hair/Nails:  Skin warm, dry, and intact, no rashes  Neurologic:  Alert and oriented x3, no cranial nerve deficits, normal strength and tone, gait steady  Extremities:  No edema.  Shanell's sign negative.    Genitourinary: deferred  Pulses:  Equal bilaterally     I have had an Advance Directives discussion with the patient.      This note has been dictated using voice recognition software. Any grammatical or context distortions are  unintentional and inherent to the the software.

## 2021-06-17 NOTE — PROGRESS NOTES
Assessment:     1. GERD (gastroesophageal reflux disease)  ranitidine (ZANTAC) 150 MG tablet   2. Essential hypertension  lisinopril-hydrochlorothiazide (PRINZIDE,ZESTORETIC) 20-25 mg per tablet   3. Stage 3 chronic kidney disease  lisinopril-hydrochlorothiazide (PRINZIDE,ZESTORETIC) 20-25 mg per tablet       Plan:     1. GERD (gastroesophageal reflux disease)  Patient will increase his Zantac to twice daily as he has had about 75-80% improvement in her goal is 100%; of interest his smell and taste have returned probably related to the antihistamine effect of this medication  - ranitidine (ZANTAC) 150 MG tablet; Take 1 tablet (150 mg total) by mouth at bedtime. One in the Am and one in the evening  Dispense: 120 tablet; Refill: 2    2. Essential hypertension  Continue the following he is to present here for complete physical within the next 45 days  - lisinopril-hydrochlorothiazide (PRINZIDE,ZESTORETIC) 20-25 mg per tablet; Take 1 tablet by mouth daily.  Dispense: 90 tablet; Refill: 3    3. Stage 3 chronic kidney disease  BUN/creatinine will be drawn at the time of his physical  - lisinopril-hydrochlorothiazide (PRINZIDE,ZESTORETIC) 20-25 mg per tablet; Take 1 tablet by mouth daily.  Dispense: 90 tablet; Refill: 3      Subjective:   Ion returns for follow-up 6 months; last visit we were treating him for gastroesophageal reflux disease and institute therapy with Zantac 150 mg at bedtime.  Patient has had a great deal of improvement in his nocturnal and daily heartburn on this regimen and says he 75-80% better.  Also his taste and smell came back.  The patient did see his allergist in his ear nose and throat specialist and they concurred with the treatment and the patient is quite happy that these sensations of return.  He denies any other constitutional complaints at this time he is taking his blood pressure medication as prescribed.  He denies any visual changes hearing loss dysphagia shortness of breath dyspnea  "chest pain no diarrhea constipation urgency frequency dysuria I have renewed his medications and increase to Zantac 250 twice daily we will do laboratory in the next 6 weeks to include BUN/creatinine keep an eye on his kidney function I did do a medication management today all medical questions that were asked were answered I personally reviewed family social history surgeries allergies problems list.    Review of Systems: A complete 14 point review of systems was obtained and is negative or as stated in the history of present illness.    Past Medical History:   Diagnosis Date     Cataract      Family History   Problem Relation Age of Onset     Cancer Father      lung     Cancer Brother      colon CA with mets to liver     Past Surgical History:   Procedure Laterality Date     EYE SURGERY       Social History   Substance Use Topics     Smoking status: Never Smoker     Smokeless tobacco: Never Used     Alcohol use No         Objective:   /68  Pulse 60  Ht 5' 9\" (1.753 m)  Wt 183 lb 3.2 oz (83.1 kg)  BMI 27.05 kg/m2    General Appearance:  Alert, cooperative, no distress  Head:  Normocephalic, no obvious abnormality  Ears: TM anatomy normal  Eyes:  PERRL, EOM's intact, conjunctiva and corneas clear  Nose:  Nares symmetrical, septum midline, mucosa pink, no sinus tenderness  Throat:  Lips, tongue, and mucosa are moist, pink, and intact  Neck:  Supple, symmetrical, trachea midline, no adenopathy; thyroid: no enlargement, symmetric,no tenderness/mass/nodules; no carotid bruit, no JVD  Back:  Symmetrical, no curvature, ROM normal, no CVA tenderness  Chest/Breast:  No mass or tenderness  Lungs:  Clear to auscultation bilaterally, respirations unlabored   Heart:  Normal PMI, regular rate & rhythm, S1 and S2 normal, no murmurs, rubs, or gallops  Abdomen:  Soft, non-tender, bowel sounds active all four quadrants, no mass, or organomegaly  Musculoskeletal:  Tone and strength strong and symmetrical, all " extremities  Lymphatic:  No adenopathy  Skin/Hair/Nails:  Skin warm, dry, and intact, no rashes  Neurologic:  Alert and oriented x3, no cranial nerve deficits, normal strength and tone, gait steady  Extremities:  No edema.  Shanell's sign negative.    Genitourinary: deferred  Pulses:  Equal bilaterally     I have had an Advance Directives discussion with the patient.      This note has been dictated using voice recognition software. Any grammatical or context distortions are unintentional and inherent to the the software.

## 2021-06-17 NOTE — PATIENT INSTRUCTIONS - HE
Patient Instructions by Ion Lofton MD at 6/13/2019 12:00 PM     Author: Ion Lofton MD Service: -- Author Type: Physician    Filed: 6/13/2019 12:29 PM Encounter Date: 6/13/2019 Status: Signed    : Ion Lofton MD (Physician)         Patient Education   Understanding Advance Care Planning  Advance care planning is the process of deciding ones own future medical care. It helps ensure that if you cant speak for yourself, your wishes can still be carried out. The plan is a series of legal documents that note a persons wishes. The documents vary by state. Advance care planning may be done when a person has a serious illness that is expected to get worse. It may be done before major surgery. And it can help you and your family be prepared in case of a major illness or injury. Advance care planning helps with making decisions at these times.       A health care proxy is a person who acts as the voice of a patient when the patient cant speak for himself or herself. The name of this role varies by state. It may be called a Durable Medical Power of  or Durable Power of  for Healthcare. It may be called an agent, surrogate, or advocate. Or it may be called a representative or decision maker. It is an official duty that is identified by a legal document. The document also varies by state.    Why Is Advance Care Planning Important?  If a person communicates their healthcare wishes:    They will be given medical care that matches their values and goals.    Their family members will not be forced to make decisions in a crisis with no guidance.  Creating a Plan  Making an advance care plan is often done in 3 steps:    Thinking about ones wishes. To create an advance care plan, you should think about what kind of medical treatment you would want if you lose the ability to communicate. Are there any situations in which you would refuse or stop treatment? Are there therapies you would want or not  want? And whom do you want to make decisions for you? There are many places to learn more about how to plan for your care. Ask your doctor or  for resources.    Picking a health care proxy. This means choosing a trusted person to speak for you only when you cant speak for yourself. When you cannot make medical decisions, your proxy makes sure the instructions in your advance care plan are followed. A proxy does not make decisions based on his or her own opinions. They must put aside those opinions and values if needed, and carry out your wishes.    Filling out the legal documents. There are several kinds of legal documents for advance care planning. Each one tells health care providers your wishes. The documents may vary by state. They must be signed and may need to be witnessed or notarized. You can cancel or change them whenever you wish. Depending on your state, the documents may include a Healthcare Proxy form, Living Will, Durable Medical Power of , Advance Directive, or others.  The Familys Role  The best help a family can give is to support their loved ones wishes. Open and honest communication is vital. Family should express any concerns they have about the patients choices while the patient can still make decisions.    0282-4755 [employer]. [address] [place] [address], [place] [address]. All rights reserved. This information is not intended as a substitute for professional medical care. Always follow your healthcare professional's instructions.         Also, [employer] [facility] offers a free, downloadable health care directive that allows you to share your treatment choices and personal preferences if you cannot communicate your wishes. It also allows you to appoint another person (called a health care agent) to make health care decisions if you are unable to do so. You can download an advance directive by going here: [email or web address]      Patient Education   Personalized Prevention Plan  You are due for the preventive services outlined below.  Your care team is available to assist you in scheduling these services.  If you have already completed any of these items, please share that information with your care team to update in your medical record.  Health Maintenance   Topic Date Due   ? ZOSTER VACCINES (2 of 3) 08/06/2012   ? FALL RISK ASSESSMENT  06/08/2019   ? TD 18+ HE  06/28/2019 (Originally 9/30/1963)   ? PNEUMOCOCCAL CONJUGATE VACCINE FOR ADULTS (PCV13 OR PREVNAR)  06/28/2019 (Originally 9/30/2008)   ? PNEUMOCOCCAL POLYSACCHARIDE VACCINE AGE 65 AND OVER  06/29/2019 (Originally 9/30/2008)   ? INFLUENZA VACCINE RULE BASED (Season Ended) 08/01/2019   ? COLONOSCOPY  05/30/2022   ? ADVANCE DIRECTIVES DISCUSSED WITH PATIENT  06/08/2023

## 2021-06-18 NOTE — PROGRESS NOTES
Assessment and Plan:   Patient is being seen for an annual wellness visit as well as an examination and follow-up of his chronic medical problems.  The patient does have benign essential hypertension which is well managed with lisinopril hydrochlorothiazide 2025 and has been for years patient also has classical gastroesophageal reflux disease well managed on ranitidine 150 mg twice daily; he has had a problem in the past with a large right indirect inguinal hernia which will need repair this year.  Patient has had previous cataract surgery which was successful.  Patient is also had a detached retina on the right side and a history of Bell's palsy from which he finally recovered.  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  He denies any visual complaints at this time hearing loss dysphagia shortness of breath chest pain angina abdominal pain diarrhea constipation urgency frequency dysuria colonoscopies have been up-to-date.  Medication review was done today.  All medical questions that were asked were answered I personally reviewed family social history surgeries allergies problems list we will renew his medications and do the appropriate laboratory workup for his problems to include a PSA despite his age because he has longevity on his side and I would expect him to live well into his late 80s or 90s.  Hence PSA monitoring will continue    1. Medicare annual wellness visit, initial  Workup to include  - Comprehensive Metabolic Panel  - Electrocardiogram Perform - Clinic  - Urinalysis-UC if Indicated  - HM1(CBC and Differential)  - HM1 (CBC with Diff)    2. Essential hypertension  Continue same medication no change in dosage  - Comprehensive Metabolic Panel  - Electrocardiogram Perform - Clinic  - Lipid [place]    3. Family history of colon cancer  Family history adenocarcinoma will keep an eye on PSA  - Comprehensive Metabolic Panel  - PSA (Prostatic-Specific Antigen), Annual  Screen    4. Stage 3 chronic kidney disease  Workup to include  - Comprehensive Metabolic Panel  - Urinalysis-UC if Indicated     The patient's current medical problems were reviewed.    I have had an Advance Directives discussion with the patient.  The following health maintenance schedule was reviewed with the patient and provided in printed form in the after visit summary:   Health Maintenance   Topic Date Due     INFLUENZA VACCINE RULE BASED (Season Ended) 04/26/2019 (Originally 8/1/2018)     TD 18+ HE  06/28/2019 (Originally 9/30/1963)     PNEUMOCOCCAL CONJUGATE VACCINE FOR ADULTS (PCV13 OR PREVNAR)  06/28/2019 (Originally 9/30/2008)     PNEUMOCOCCAL POLYSACCHARIDE VACCINE AGE 65 AND OVER  06/29/2019 (Originally 9/30/2008)     FALL RISK ASSESSMENT  04/27/2019     COLONOSCOPY  05/30/2022     ADVANCE DIRECTIVES DISCUSSED WITH PATIENT  04/27/2023     ZOSTER VACCINE  Completed        Subjective:   Chief Complaint: Ion Guallpa is an 72 y.o. male here for an Annual Wellness visit.   HPI: See under assessment and plan    Review of Systems: 14 point review of systems negative please see above.  The rest of the review of systems are negative for all systems.    Patient Care Team:  Ion Lofton MD as PCP - General (Family Medicine)     Patient Active Problem List   Diagnosis     Family history of colon cancer     Detached retina, right     Elevated blood pressure     Overweight (BMI 25.0-29.9)     Normochromic normocytic anemia     Stage 3 chronic kidney disease     Hypertension     Past Medical History:   Diagnosis Date     Cataract       Past Surgical History:   Procedure Laterality Date     EYE SURGERY        Family History   Problem Relation Age of Onset     Cancer Father      lung     Cancer Brother      colon CA with mets to liver      Social History     Social History     Marital status: Single     Spouse name: N/A     Number of children: N/A     Years of education: N/A     Occupational History     Not on  "file.     Social History Main Topics     Smoking status: Never Smoker     Smokeless tobacco: Never Used     Alcohol use No     Drug use: No     Sexual activity: Not on file     Other Topics Concern     Not on file     Social History Narrative      Current Outpatient Prescriptions   Medication Sig Dispense Refill     aspirin 81 MG EC tablet Take 81 mg by mouth daily.       lisinopril-hydrochlorothiazide (PRINZIDE,ZESTORETIC) 20-25 mg per tablet TAKE 1 TABLET BY MOUTH DAILY 90 tablet 0     lisinopril-hydrochlorothiazide (PRINZIDE,ZESTORETIC) 20-25 mg per tablet Take 1 tablet by mouth daily. 90 tablet 3     ranitidine (ZANTAC) 150 MG tablet Take 1 tablet (150 mg total) by mouth at bedtime. One in the Am and one in the evening 120 tablet 2     No current facility-administered medications for this visit.       Objective:   Vital Signs:   Visit Vitals     /60     Pulse (!) 56     Ht 5' 9\" (1.753 m)     Wt 182 lb 14.4 oz (83 kg)     BMI 27.01 kg/m2        VisionScreening:  No exam data present     PHYSICAL EXAM  General Appearance:  Alert, cooperative, no distress  Head:  Normocephalic, no obvious abnormality  Ears: TM anatomy normal  Eyes:  PERRL, EOM's intact, conjunctiva and corneas clear  Nose:  Nares symmetrical, septum midline, mucosa pink, no sinus tenderness  Throat:  Lips, tongue, and mucosa are moist, pink, and intact  Neck:  Supple, symmetrical, trachea midline, no adenopathy; thyroid: no enlargement, symmetric,no tenderness/mass/nodules; no carotid bruit, no JVD  Back:  Symmetrical, no curvature, ROM normal, no CVA tenderness  Chest/Breast:  No mass or tenderness  Lungs:  Clear to auscultation bilaterally, respirations unlabored   Heart:  Normal PMI, regular rate & rhythm, S1 and S2 normal, no murmurs, rubs, or gallops  Abdomen:  Soft, non-tender, bowel sounds active all four quadrants, no mass, or organomegaly  Musculoskeletal:  Tone and strength strong and symmetrical, all extremities  Lymphatic:  No " adenopathy  Skin/Hair/Nails:  Skin warm, dry, and intact, no rashes multiple actinic keratoses none look suspicious some sun damage spots on the dorsum of both legs  Neurologic:  Alert and oriented x3, no cranial nerve deficits, normal strength and tone, gait steady  Extremities:  No edema.  Shanell's sign negative.    Genitourinary: Circumcised male testes down no varicocele rectal exam prostate somewhat firm but at 40 mL  Pulses:  Equal bilaterally    Assessment Results 6/8/2018   Activities of Daily Living No help needed   Instrumental Activities of Daily Living No help needed   Mini Cog Total Score 4   Some recent data might be hidden     A Mini-Cog score of 0-2 suggests the possibility of dementia, score of 3-5 suggests no dementia    Identified Health Risks:     Information regarding advance directives (living [place]), including where he can download the appropriate form, was provided to the patient via the AVS.

## 2021-06-19 NOTE — LETTER
Letter by Ion Lofton MD at      Author: Ion Lofton MD Service: -- Author Type: --    Filed:  Encounter Date: 6/14/2019 Status: (Other)         Ion Guallpa Jr.  [address] [place] [address]             June 14, 2019         Dear Mr. Guallpa,    Below are the results from your recent visit:    Resulted Orders   Comprehensive Metabolic Panel   Result Value Ref Range    Sodium 141 136 - 145 mmol/L    Potassium 4.3 3.5 - 5.0 mmol/L    Chloride 109 (H) 98 - 107 mmol/L    CO2 21 (L) 22 - 31 mmol/L    Anion Gap, Calculation 11 5 - 18 mmol/L    Glucose 101 70 - 125 mg/dL    BUN 43 (H) 8 - 28 mg/dL    Creatinine 3.24 (H) 0.70 - 1.30 mg/dL    GFR MDRD Af Amer 23 (L) >60 mL/min/1.73m2    GFR MDRD Non Af Amer 19 (L) >60 mL/min/1.73m2    Bilirubin, Total 0.6 0.0 - 1.0 mg/dL    Calcium 9.5 8.5 - 10.5 mg/dL    Protein, Total 7.3 6.0 - 8.0 g/dL    Albumin 4.4 3.5 - 5.0 g/dL    Alkaline Phosphatase 91 45 - 120 U/L    AST 14 0 - 40 U/L    ALT <9 0 - 45 U/L    Narrative    Fasting Glucose reference range is 70-99 mg/dL per  American Diabetes Association (ADA) guidelines.   HM2(CBC w/o Differential)   Result Value Ref Range    WBC 8.3 4.0 - 11.0 thou/uL    RBC 4.35 (L) 4.40 - 6.20 mill/uL    Hemoglobin 13.4 (L) 14.0 - 18.0 g/dL    Hematocrit 40.2 40.0 - 54.0 %    MCV 92 80 - 100 fL    MCH 30.7 27.0 - 34.0 pg    MCHC 33.2 32.0 - 36.0 g/dL    RDW 11.9 11.0 - 14.5 %    Platelets 153 140 - 440 thou/uL    MPV 7.7 7.0 - 10.0 fL   Lipid [place]   Result Value Ref Range    Cholesterol 141 <=199 mg/dL    Triglycerides 78 <=149 mg/dL    HDL Cholesterol 38 (L) >=40 mg/dL    LDL Calculated 87 <=129 mg/dL    Patient Fasting > 8hrs? No    PSA (Prostatic-Specific Antigen), Annual Screen   Result Value Ref Range    PSA 1.5 0.0 - 6.5 ng/mL    Narrative    Method is Abbott Prostate-Specific Antigen (PSA)  Standard-WHO 1st International (90:10)   Urinalysis-UC if Indicated   Result Value Ref Range    Color, UA Yellow Colorless,  Yellow, Straw, Light Yellow    Clarity, UA Clear Clear    Glucose, UA Negative Negative    Bilirubin, UA Negative Negative    Ketones, UA Negative Negative    Specific Gravity, UA 1.020 1.005 - 1.030    Blood, UA Negative Negative    pH, UA 5.5 5.0 - 8.0    Protein, UA Negative Negative mg/dL    Urobilinogen, UA 0.2 E.U./dL 0.2 E.U./dL, 1.0 E.U./dL    Nitrite, UA Negative Negative    Leukocytes, UA Negative Negative    Narrative    Microscopic not indicated  UC not indicated       Macario your kidney function is getting a little bit worse although not critical so I want you to see a kidney specialist to overview everything.  I will set it up for you.  Don't get excited I just want the best for you    Please call with questions or contact us using [employer].    Sincerely,        Electronically signed by Ion Lofton MD

## 2021-06-20 NOTE — LETTER
Letter by Ion Lofton MD at      Author: Ion Lofton MD Service: -- Author Type: --    Filed:  Encounter Date: 1/14/2020 Status: Signed         Ion WOLFE Ramu .  [address] [place] [address]             January 14, 2020         Dear Mr. Guallpa,    Below are the results from your recent visit:    Resulted Orders   Iron and Transferrin Iron Binding Capacity   Result Value Ref Range    Iron 90 42 - 175 ug/dL    Transferrin 216 212 - 360 mg/dL    Transferrin Saturation, Calculated 33 20 - 50 %    Transferrin IBC, Calculated 270 (L) 313 - 563 ug/dL   Ferritin   Result Value Ref Range    Ferritin 76 27 - 300 ng/mL   Folate, Serum   Result Value Ref Range    Folate 7.5 >=3.5 ng/mL   Vitamin B12   Result Value Ref Range    Vitamin B-12 268 213 - 816 pg/mL       Alter the diet as we discussed and take the pills;nothing dangerous is going on    Please call with questions or contact us using [employer].    Sincerely,        Electronically signed by Ion Lofton MD

## 2021-06-20 NOTE — LETTER
Letter by Ion Lofton MD at      Author: Ion Lofton MD Service: -- Author Type: --    Filed:  Encounter Date: 8/14/2020 Status: (Other)         Ion Guallpa Jr.  [address] [place] [address]             August 14, 2020         Dear Mr. Guallpa,    Below are the results from your recent visit:    Resulted Orders   Comprehensive Metabolic Panel   Result Value Ref Range    Sodium 141 136 - 145 mmol/L    Potassium 5.5 (H) 3.5 - 5.0 mmol/L    Chloride 111 (H) 98 - 107 mmol/L    CO2 20 (L) 22 - 31 mmol/L    Anion Gap, Calculation 10 5 - 18 mmol/L    Glucose 91 70 - 125 mg/dL    BUN 44 (H) 8 - 28 mg/dL    Creatinine 2.94 (H) 0.70 - 1.30 mg/dL    GFR MDRD Af Amer 25 (L) >60 mL/min/1.73m2    GFR MDRD Non Af Amer 21 (L) >60 mL/min/1.73m2    Bilirubin, Total 0.7 0.0 - 1.0 mg/dL    Calcium 9.6 8.5 - 10.5 mg/dL    Protein, Total 7.5 6.0 - 8.0 g/dL    Albumin 4.6 3.5 - 5.0 g/dL    Alkaline Phosphatase 85 45 - 120 U/L    AST 12 0 - 40 U/L    ALT <9 0 - 45 U/L    Narrative    Fasting Glucose reference range is 70-99 mg/dL per  American Diabetes Association (ADA) guidelines.   HM2(CBC w/o Differential)   Result Value Ref Range    WBC 8.0 4.0 - 11.0 thou/uL    RBC 4.24 (L) 4.40 - 6.20 mill/uL    Hemoglobin 13.7 (L) 14.0 - 18.0 g/dL    Hematocrit 39.6 (L) 40.0 - 54.0 %    MCV 93 80 - 100 fL    MCH 32.3 27.0 - 34.0 pg    MCHC 34.6 32.0 - 36.0 g/dL    RDW 12.1 11.0 - 14.5 %    Platelets 162 140 - 440 thou/uL    MPV 7.0 7.0 - 10.0 fL   PSA (Prostatic-Specific Antigen), Annual Screen   Result Value Ref Range    PSA 1.4 0.0 - 6.5 ng/mL    Narrative    Method is Abbott Prostate-Specific Antigen (PSA)  Standard-WHO 1st International (90:10)   Urinalysis-UC if Indicated   Result Value Ref Range    Color, UA Yellow Colorless, Yellow, Straw, Light Yellow    Clarity, UA Clear Clear    Glucose, UA Negative Negative    Bilirubin, UA Negative Negative    Ketones, UA Negative Negative    Specific Gravity, UA 1.020 1.005 -  1.030    Blood, UA Negative Negative    pH, UA 5.0 5.0 - 8.0    Protein, UA Negative Negative mg/dL    Urobilinogen, UA 0.2 E.U./dL 0.2 E.U./dL, 1.0 E.U./dL    Nitrite, UA Negative Negative    Leukocytes, UA Negative Negative    Narrative    Microscopic not indicated  UC not indicated       Macario your kidneys are a little weaker than last time; please make sure you are following with [facility] kidney specialist; rest of tests look OK; anemia better    Please call with questions or contact us using [employer].    Sincerely,        Electronically signed by Ion Lofton MD

## 2021-06-23 NOTE — TELEPHONE ENCOUNTER
Refill Approved    Rx renewed per Medication Renewal Policy. Medication was last renewed on 4/27/18 .    Odilon Aiken, [facility] Triage/Med Refill 1/19/2019     Requested Prescriptions   Pending Prescriptions Disp Refills     ranitidine (ZANTAC) 150 MG tablet [Pharmacy Med Name: RANITIDINE 150MG TABLETS] 120 tablet 0     Sig: TAKE 1 TABLET BY MOUTH TWICE DAILY    GI Medications Refill Protocol Passed - 1/18/2019  6:41 PM       Passed - PCP or prescribing provider visit in last 12 or next 3 months.    Last office visit with prescriber/PCP: 4/27/2018 Ion Lofton MD OR same dept: 4/27/2018 Ion Lofton MD OR same specialty: 4/27/2018 Ion Lofton MD  Last physical: 6/8/2018 Last MTM visit: Visit date not found   Next visit within 3 mo: Visit date not found  Next physical within 3 mo: Visit date not found  Prescriber OR PCP: Ion Lofton MD  Last diagnosis associated with med order: 1. GERD (gastroesophageal reflux disease)  - ranitidine (ZANTAC) 150 MG tablet [Pharmacy Med Name: RANITIDINE 150MG TABLETS]; TAKE 1 TABLET BY MOUTH TWICE DAILY  Dispense: 120 tablet; Refill: 0    If protocol passes may refill for 12 months if within 3 months of last provider visit (or a total of 15 months).

## 2021-06-24 NOTE — PATIENT INSTRUCTIONS - HE
Hopefully we have this all figured out now and things will just keep improving/resolving    For now:  -keep the wrap on for 24-36 hours  -keep icing like you were doing  -if the swelling increases again, would restart compression wrap - put on in the morning and take off at night    If develops any redness or pain - come to clinic or ER

## 2021-06-24 NOTE — PROGRESS NOTES
Assessment/Plan:    1. Elbow swelling, left  2. Olecranon bursitis of left elbow  3. Arm erythema  4. Fall, initial encounter  Patient with a fall yesterday, bumped left elbow and now with significant swelling.  Patient denies any pain of elbow or other joints, low suspicion for fracture therefore no x-ray was obtained at this time.  Differential includes traumatic effusion versus septic joint.  Concern for septic joint is increased with superficial abrasions overlying elbow and faint erythema of the area.  Synovial fluid was aspirated in the office, sent for labs as below.  Gram stain/culture sent as stat, should received results within 30 minutes -if Gram stain is positive for bacteria discussed with patient that he would need to go to the ER for IV antibiotics and washout of the joint with orthopedic surgery.  If Gram stain not suggestive of infection, would still consider prescription of antibiotics for superficial cellulitis (such as keflex or doxycycline).  - Culture/Gram Stain: Joint  - Synovial fluid, crystal  - Synovial fluid, cell count  - Protein, body fluid  - Glucose, body fluid  - Joint Fluid Exam  - Glucose, Body Fluid  - Protein, Body Fluid    Follow up: pending gram stain results    Radha Yanez MD  [facility]    Addendum:   Gram stain returned with no organisms and cell count shows 1747 WBC with neutrophil predominance. Not consistent with septic joint. Discussed results with pt in the office; did provide keflex for suspected overlying cellulitis.    Subjective:    Patient ID: Ion Guallpa Jr. is a 73 y.o. male is here today for elbow swelling    Fall, elbow swelling  -had a fall on Wednesday after the snowstorm while shoveling, fell and bumped elbow  -significant swelling of olecranon bursa  -no pain at all  -yesterday did take a sterile needle and poked into it a little bit  -took 3 advil today to be safe but isn't having any pain  -no fevers  -was deciding today between  coming to clinic vs [facility] vs poking and draining on his own at home      Patient Active Problem List   Diagnosis     Family history of colon cancer     Detached retina, right     Elevated blood pressure     Overweight (BMI 25.0-29.9)     Normochromic normocytic anemia     Stage 3 chronic kidney disease (H)     Hypertension     Past Medical History:   Diagnosis Date     Cataract      Past Surgical History:   Procedure Laterality Date     EYE SURGERY       Current Outpatient Medications on File Prior to Visit   Medication Sig Dispense Refill     aspirin 81 MG EC tablet Take 81 mg by mouth daily.       lisinopril-hydrochlorothiazide (PRINZIDE,ZESTORETIC) 20-25 mg per tablet TAKE 1 TABLET BY MOUTH DAILY 90 tablet 0     lisinopril-hydrochlorothiazide (PRINZIDE,ZESTORETIC) 20-25 mg per tablet Take 1 tablet by mouth daily. 90 tablet 3     ranitidine (ZANTAC) 150 MG tablet TAKE 1 TABLET BY MOUTH TWICE DAILY 120 tablet 1     No current facility-administered medications on file prior to visit.      No Known Allergies  Social History     Socioeconomic History     Marital status: Single     Spouse name: Not on file     Number of children: Not on file     Years of education: Not on file     Highest education level: Not on file   Occupational History     Not on file   Social Needs     Financial resource strain: Not on file     Food insecurity:     Worry: Not on file     Inability: Not on file     Transportation needs:     Medical: Not on file     Non-medical: Not on file   Tobacco Use     Smoking status: Never Smoker     Smokeless tobacco: Never Used   Substance and Sexual Activity     Alcohol use: No     Drug use: No     Sexual activity: Not on file   Lifestyle     Physical activity:     Days per week: Not on file     Minutes per session: Not on file     Stress: Not on file   Relationships     Social connections:     Talks on phone: Not on file     Gets together: Not on file     Attends Jain service: Not on file      Active member of club or organization: Not on file     Attends meetings of clubs or organizations: Not on file     Relationship status: Not on file     Intimate partner violence:     Fear of current or ex partner: Not on file     Emotionally abused: Not on file     Physically abused: Not on file     Forced sexual activity: Not on file   Other Topics Concern     Not on file   Social History Narrative     Not on file     Review of systems is as stated in HPI, and the remainder of system review is otherwise negative.    Objective:      /70   Pulse (!) 57   Wt 179 lb (81.2 kg)   BMI 26.43 kg/m      General appearance: awake, NAD  HEENT: atraumatic, normocephalic, no scleral icterus or injection, ears and nose grossly normal, moist mucous membranes  Neck: normal ROM  Lungs: breathing comfortably on room air  Extremities: significant swelling of olecranon bursa on left elbow with few overlying superficial abrasions (no bleeding or pustular drainage), faint erythema of left elbow and forearm compared to right side, no tenderness with palpation of left shoulder/elbow/wrist/hand  Neuro: alert, oriented x3, CNs grossly intact, no focal deficits appreciated  Psych: normal mood/affect/behavior, answering questions appropriately, linear thought process

## 2021-06-24 NOTE — PROGRESS NOTES
Assessment/Plan:    1. Olecranon bursitis of left elbow  Bursa drained today with removal of 12 cc of serosanguineous/slightly bloody bodily fluid.  Following drainage, bursa returned to normal size.  Bandage was placed over aspiration site and elbow was wrapped in compression wrap.  Discussed management including: Leaving wrap on for 24-36 hours, covering aspiration site with bandage, continued icing, monitoring for development of redness or pain in which case patient should be seen back in clinic or go to the ER.  I do not feel that any antibiotics are indicated at this time.  No concern for septic joint.      Follow up: As needed    Radha Yanez MD  [facility]    Subjective:    Patient ID: Ion Guallpa Jr. is a 73 y.o. male is here today for left elbow bursitis    Left elbow bursitis  -Initially seen for this issue on 2/22/19, olecranon bursitis of left elbow following a traumatic injury with a fall; bursa was drained at this visit and fluid sent for evaluation, found to be noninfected  -Today patient states he has persistent swelling, feels that it has not changed much since last visit  -Patient shares that he wore the compression wrap for 24 hours and then took it off and did not use it again  -Patient shares that he was diligent in icing and felt that this helped a little bit but not with the swelling  -Patient continues to have no pain of the elbow  -No overlying redness or rash      Patient Active Problem List   Diagnosis     Family history of colon cancer     Detached retina, right     Elevated blood pressure     Overweight (BMI 25.0-29.9)     Normochromic normocytic anemia     Stage 3 chronic kidney disease (H)     Hypertension     Past Medical History:   Diagnosis Date     Cataract      Past Surgical History:   Procedure Laterality Date     EYE SURGERY       Current Outpatient Medications on File Prior to Visit   Medication Sig Dispense Refill     aspirin 81 MG EC tablet Take 81 mg by  mouth daily.       lisinopril-hydrochlorothiazide (PRINZIDE,ZESTORETIC) 20-25 mg per tablet TAKE 1 TABLET BY MOUTH DAILY 90 tablet 0     lisinopril-hydrochlorothiazide (PRINZIDE,ZESTORETIC) 20-25 mg per tablet Take 1 tablet by mouth daily. 90 tablet 3     ranitidine (ZANTAC) 150 MG tablet TAKE 1 TABLET BY MOUTH TWICE DAILY 120 tablet 1     No current facility-administered medications on file prior to visit.      No Known Allergies  Social History     Socioeconomic History     Marital status: Single     Spouse name: Not on file     Number of children: Not on file     Years of education: Not on file     Highest education level: Not on file   Occupational History     Not on file   Social Needs     Financial resource strain: Not on file     Food insecurity:     Worry: Not on file     Inability: Not on file     Transportation needs:     Medical: Not on file     Non-medical: Not on file   Tobacco Use     Smoking status: Never Smoker     Smokeless tobacco: Never Used   Substance and Sexual Activity     Alcohol use: No     Drug use: No     Sexual activity: Not on file   Lifestyle     Physical activity:     Days per week: Not on file     Minutes per session: Not on file     Stress: Not on file   Relationships     Social connections:     Talks on phone: Not on file     Gets together: Not on file     Attends Amish service: Not on file     Active member of club or organization: Not on file     Attends meetings of clubs or organizations: Not on file     Relationship status: Not on file     Intimate partner violence:     Fear of current or ex partner: Not on file     Emotionally abused: Not on file     Physically abused: Not on file     Forced sexual activity: Not on file   Other Topics Concern     Not on file   Social History Narrative     Not on file     Review of systems is as stated in HPI, and the remainder of system review is otherwise negative.    Objective:      /60   Pulse (!) 51   Wt 179 lb 9 oz (81.4 kg)    BMI 26.52 kg/m      General appearance: awake, NAD  HEENT: atraumatic, normocephalic, no scleral icterus or injection, ears and nose grossly normal, moist mucous membranes  Lungs: breathing comfortably on room air  Left elbow: Large swelling of bursa, no overlying redness or rash, no tenderness with palpation, normal range of motion of elbow without pain  Neuro: alert, CNs grossly intact, no focal deficits appreciated  Psych: normal mood/affect/behavior, answering questions appropriately, linear thought process

## 2021-06-25 NOTE — TELEPHONE ENCOUNTER
Refill Approved    Rx renewed per Medication Renewal Policy. Medication was last renewed on 4/27/20.    Stanislav Alba, [facility] Triage/Med Refill 6/3/2021     Requested Prescriptions   Pending Prescriptions Disp Refills     lisinopriL-hydrochlorothiazide (PRINZIDE,ZESTORETIC) 20-25 mg per tablet [Pharmacy Med Name: LISINOPRIL-HCTZ 20/25MG TABLETS] 90 tablet 3     Sig: TAKE 1 TABLET BY MOUTH DAILY       Diuretics/Combination Diuretics Refill Protocol  Passed - 6/1/2021  6:15 PM        Passed - Visit with PCP or prescribing provider visit in past 12 months     Last office visit with prescriber/PCP: 1/13/2020 Ion Lofton MD OR same dept: Visit date not found OR same specialty: 1/13/2020 Ion Lofton MD  Last physical: 8/13/2020 Last MTM visit: Visit date not found   Next visit within 3 mo: Visit date not found  Next physical within 3 mo: Visit date not found  Prescriber OR PCP: Ion Lofton MD  Last diagnosis associated with med order: 1. Essential hypertension  - lisinopriL-hydrochlorothiazide (PRINZIDE,ZESTORETIC) 20-25 mg per tablet [Pharmacy Med Name: LISINOPRIL-HCTZ 20/25MG TABLETS]; Take 1 tablet by mouth daily.  Dispense: 90 tablet; Refill: 3    2. Stage 3 chronic kidney disease  - lisinopriL-hydrochlorothiazide (PRINZIDE,ZESTORETIC) 20-25 mg per tablet [Pharmacy Med Name: LISINOPRIL-HCTZ 20/25MG TABLETS]; Take 1 tablet by mouth daily.  Dispense: 90 tablet; Refill: 3    If protocol passes may refill for 12 months if within 3 months of last provider visit (or a total of 15 months).             Passed - Serum Potassium in past 12 months      Lab Results   Component Value Date    Potassium 5.5 (H) 08/13/2020             Passed - Serum Sodium in past 12 months      Lab Results   Component Value Date    Sodium 141 08/13/2020             Passed - Blood pressure on file in past 12 months     BP Readings from Last 1 Encounters:   08/13/20 126/60             Passed - Serum Creatinine in past 12 months       Creatinine   Date Value Ref Range Status   08/13/2020 2.94 (H) 0.70 - 1.30 mg/dL Final

## 2021-08-18 ENCOUNTER — OFFICE VISIT (OUTPATIENT)
Dept: FAMILY MEDICINE | Facility: CLINIC | Age: 76
End: 2021-08-18
Payer: MEDICARE

## 2021-08-18 VITALS
DIASTOLIC BLOOD PRESSURE: 68 MMHG | BODY MASS INDEX: 24.88 KG/M2 | WEIGHT: 173.8 LBS | HEIGHT: 70 IN | SYSTOLIC BLOOD PRESSURE: 106 MMHG

## 2021-08-18 DIAGNOSIS — Z00.00 MEDICARE ANNUAL WELLNESS VISIT, SUBSEQUENT: ICD-10-CM

## 2021-08-18 DIAGNOSIS — N18.31 STAGE 3A CHRONIC KIDNEY DISEASE (H): ICD-10-CM

## 2021-08-18 DIAGNOSIS — D64.9 NORMOCHROMIC NORMOCYTIC ANEMIA: ICD-10-CM

## 2021-08-18 DIAGNOSIS — I10 BENIGN ESSENTIAL HYPERTENSION: ICD-10-CM

## 2021-08-18 DIAGNOSIS — Z11.59 NEED FOR HEPATITIS C SCREENING TEST: Primary | ICD-10-CM

## 2021-08-18 LAB
ALBUMIN SERPL-MCNC: 4 G/DL (ref 3.5–5)
ALBUMIN UR-MCNC: NEGATIVE MG/DL
ALP SERPL-CCNC: 86 U/L (ref 45–120)
ALT SERPL W P-5'-P-CCNC: <9 U/L (ref 0–45)
ANION GAP SERPL CALCULATED.3IONS-SCNC: 11 MMOL/L (ref 5–18)
APPEARANCE UR: CLEAR
AST SERPL W P-5'-P-CCNC: 14 U/L (ref 0–40)
BILIRUB SERPL-MCNC: 0.4 MG/DL (ref 0–1)
BILIRUB UR QL STRIP: NEGATIVE
BUN SERPL-MCNC: 53 MG/DL (ref 8–28)
CALCIUM SERPL-MCNC: 9.1 MG/DL (ref 8.5–10.5)
CHLORIDE BLD-SCNC: 111 MMOL/L (ref 98–107)
CO2 SERPL-SCNC: 20 MMOL/L (ref 22–31)
COLOR UR AUTO: YELLOW
CREAT SERPL-MCNC: 3.06 MG/DL (ref 0.7–1.3)
ERYTHROCYTE [DISTWIDTH] IN BLOOD BY AUTOMATED COUNT: 13.5 % (ref 10–15)
GFR SERPL CREATININE-BSD FRML MDRD: 19 ML/MIN/1.73M2
GLUCOSE BLD-MCNC: 103 MG/DL (ref 70–125)
GLUCOSE UR STRIP-MCNC: NEGATIVE MG/DL
HCT VFR BLD AUTO: 37 % (ref 40–53)
HGB BLD-MCNC: 12.4 G/DL (ref 13.3–17.7)
HGB UR QL STRIP: NEGATIVE
KETONES UR STRIP-MCNC: NEGATIVE MG/DL
LEUKOCYTE ESTERASE UR QL STRIP: NEGATIVE
MCH RBC QN AUTO: 31 PG (ref 26.5–33)
MCHC RBC AUTO-ENTMCNC: 33.5 G/DL (ref 31.5–36.5)
MCV RBC AUTO: 93 FL (ref 78–100)
NITRATE UR QL: NEGATIVE
PH UR STRIP: 5 [PH] (ref 5–8)
PLATELET # BLD AUTO: 140 10E3/UL (ref 150–450)
POTASSIUM BLD-SCNC: 5.5 MMOL/L (ref 3.5–5)
PROT SERPL-MCNC: 6.8 G/DL (ref 6–8)
PSA SERPL-MCNC: 1.25 UG/L (ref 0–6.5)
RBC # BLD AUTO: 4 10E6/UL (ref 4.4–5.9)
SODIUM SERPL-SCNC: 142 MMOL/L (ref 136–145)
SP GR UR STRIP: 1.02 (ref 1–1.03)
UROBILINOGEN UR STRIP-ACNC: 0.2 E.U./DL
WBC # BLD AUTO: 6.1 10E3/UL (ref 4–11)

## 2021-08-18 PROCEDURE — 80053 COMPREHEN METABOLIC PANEL: CPT | Performed by: FAMILY MEDICINE

## 2021-08-18 PROCEDURE — 86803 HEPATITIS C AB TEST: CPT | Performed by: FAMILY MEDICINE

## 2021-08-18 PROCEDURE — 99397 PER PM REEVAL EST PAT 65+ YR: CPT | Performed by: FAMILY MEDICINE

## 2021-08-18 PROCEDURE — 81003 URINALYSIS AUTO W/O SCOPE: CPT | Performed by: FAMILY MEDICINE

## 2021-08-18 PROCEDURE — 99213 OFFICE O/P EST LOW 20 MIN: CPT | Mod: 25 | Performed by: FAMILY MEDICINE

## 2021-08-18 PROCEDURE — G0103 PSA SCREENING: HCPCS | Performed by: FAMILY MEDICINE

## 2021-08-18 PROCEDURE — 36415 COLL VENOUS BLD VENIPUNCTURE: CPT | Performed by: FAMILY MEDICINE

## 2021-08-18 PROCEDURE — 85027 COMPLETE CBC AUTOMATED: CPT | Performed by: FAMILY MEDICINE

## 2021-08-18 RX ORDER — ALLOPURINOL 100 MG/1
200 TABLET ORAL DAILY
COMMUNITY
Start: 2021-04-02

## 2021-08-18 RX ORDER — ACETAMINOPHEN 500 MG
500 TABLET ORAL EVERY 8 HOURS PRN
COMMUNITY

## 2021-08-18 RX ORDER — FERROUS SULFATE 325(65) MG
325 TABLET ORAL
COMMUNITY
Start: 2020-04-27 | End: 2023-07-18

## 2021-08-18 RX ORDER — FAMOTIDINE 20 MG/1
TABLET, FILM COATED ORAL
COMMUNITY
Start: 2020-05-18 | End: 2021-10-28

## 2021-08-18 RX ORDER — ASCORBIC ACID 500 MG
500 TABLET ORAL DAILY
COMMUNITY

## 2021-08-18 RX ORDER — LISINOPRIL AND HYDROCHLOROTHIAZIDE 20; 25 MG/1; MG/1
1 TABLET ORAL DAILY
COMMUNITY
Start: 2021-06-03 | End: 2021-10-05

## 2021-08-18 ASSESSMENT — MIFFLIN-ST. JEOR: SCORE: 1511.66

## 2021-08-18 ASSESSMENT — ACTIVITIES OF DAILY LIVING (ADL): CURRENT_FUNCTION: NO ASSISTANCE NEEDED

## 2021-08-18 NOTE — PROGRESS NOTES
"SUBJECTIVE:   Ion Guallpa Jr. is a 75 year old male who presents for Preventive Visit.  His chronic problems include gout well managed with allopurinol GERD managed with OTC famotidine.  Normocytic normochromic anemia managed with OTC ferrous sulfate.  Benign essential hypertension well-controlled with lisinopril hydrochlorothiazide.  He does take OTC vitamin C.  He swims on a daily basis.  System review unremarkable patient has had ophthalmic surgery in the past and a history of Bell's palsy.  He has had retinal balloon surgery very successful.  Patient is doing really quite well medications were reviewed.  All medical questions asked were answered I personally reviewed family social history surgeries allergies problems list follow-up 1 year nice to see Macario again after this was dictated I did note that his kidney function tests have declined somewhat and I wrote him a note explaining this that his chronic kidney disease has advanced little bit and he should contact his kidney specialist who I believe he has an appointment with in the next month or so we will follow along with his recommendations.    Patient has been advised of split billing requirements and indicates understanding: Yes   Are you in the first 12 months of your Medicare coverage?  No    Healthy Habits:     In general, how would you rate your overall health?  Excellent    Frequency of exercise:  6-7 days/week    Duration of exercise:  Greater than 60 minutes    Do you usually eat at least 4 servings of fruit and vegetables a day, include whole grains    & fiber and avoid regularly eating high fat or \"junk\" foods?  Yes    Taking medications regularly:  Yes    Medication side effects:  None    Ability to successfully perform activities of daily living:  No assistance needed    Home Safety:  No safety concerns identified    Hearing Impairment:  No hearing concerns    In the past 6 months, have you been bothered by leaking of urine?  No    In general, " how would you rate your overall mental or emotional health?  Excellent      PHQ-2 Total Score: 0    Additional concerns today:  No    Do you feel safe in your environment? Yes    Have you ever done Advance Care Planning? (For example, a Health Directive, POLST, or a discussion with a medical provider or your loved ones about your wishes): No, advance care planning information given to patient to review.  Patient plans to discuss their wishes with loved ones or provider.     Fall risk  Fallen 2 or more times in the past year?: No  Any fall with injury in the past year?: No    Cognitive Screening   1) Repeat 3 items (Leader, Season, Table)    2) Clock draw: NORMAL  3) 3 item recall: Recalls 3 objects  Results: 3 items recalled: COGNITIVE IMPAIRMENT LESS LIKELY    Mini-CogTM Copyright S Cece. Licensed by the author for use in [facility]; reprinted with permission (clinton@[facility]). All rights reserved.        Reviewed and updated as needed this visit by clinical staff   Allergies  Meds              Reviewed and updated as needed this visit by Provider                Social History     Tobacco Use     Smoking status: Never Smoker     Smokeless tobacco: Never Used   Substance Use Topics     Alcohol use: No     If you drink alcohol do you typically have >3 drinks per day or >7 drinks per week? No    Alcohol Use 8/18/2021   Prescreen: >3 drinks/day or >7 drinks/week? Not Applicable   No flowsheet data found.      Current providers sharing in care for this patient include:   Patient Care Team:  Ion Lofton MD as PCP - General  Ion Lofton MD as Assigned PCP    The following health maintenance items are reviewed in Epic and correct as of today:  Health Maintenance Due   Topic Date Due     Pneumococcal Vaccine: 65+ Years (1 of 4 - PCV13) Never done     HEPATITIS C SCREENING  Never done     DTAP/TDAP/TD IMMUNIZATION (1 - Tdap) Never done     ZOSTER IMMUNIZATION (2 of 3) 08/06/2012     FALL RISK ASSESSMENT  " 06/13/2020     MEDICARE ANNUAL WELLNESS VISIT  08/13/2021     Lab work is in process          Review of Systems  Constitutional, HEENT, cardiovascular, pulmonary, GI, , musculoskeletal, neuro, skin, endocrine and psych systems are negative, except as otherwise noted.    OBJECTIVE:   /68   Ht 1.765 m (5' 9.5\")   Wt 78.8 kg (173 lb 12.8 oz)   BMI 25.30 kg/m   Estimated body mass index is 25.3 kg/m  as calculated from the following:    Height as of this encounter: 1.765 m (5' 9.5\").    Weight as of this encounter: 78.8 kg (173 lb 12.8 oz).  Physical Exam  GENERAL: healthy, alert and no distress  EYES: Eyes grossly normal to inspection, PERRL and conjunctivae and sclerae normal  HENT: ear canals and TM's normal, nose and mouth without ulcers or lesions  NECK: no adenopathy, no asymmetry, masses, or scars and thyroid normal to palpation  RESP: lungs clear to auscultation - no rales, rhonchi or wheezes  CV: regular rate and rhythm, normal S1 S2, no S3 or S4, no murmur, click or rub, no peripheral edema and peripheral pulses strong  ABDOMEN: soft, nontender, no hepatosplenomegaly, no masses and bowel sounds normal; prostate normal genitalia normal  MS: no gross musculoskeletal defects noted, no edema  SKIN: no suspicious lesions or rashes  NEURO: Normal strength and tone, mentation intact and speech normal  PSYCH: mentation appears normal, affect normal/bright        ASSESSMENT / PLAN:       ICD-10-CM    1. Need for hepatitis C screening test  Z11.59 Hepatitis C Screen Reflex to HCV RNA Quant and Genotype     Hepatitis C Screen Reflex to HCV RNA Quant and Genotype   2. Benign essential hypertension  I10 Comprehensive metabolic panel     UA reflex to Microscopic and Culture     UA reflex to Microscopic and Culture     Comprehensive metabolic panel     CANCELED: EKG 12-lead, tracing only   3. Normochromic normocytic anemia  D64.9 CBC with platelets     CBC with platelets   4. Medicare annual wellness visit, " "subsequent  Z00.00 Prostate Specific Antigen Screen     Prostate Specific Antigen Screen     CANCELED: EKG 12-lead, tracing only   5. Stage 3a chronic kidney disease  N18.31        Patient has been advised of split billing requirements and indicates understanding:   COUNSELING:      Estimated body mass index is 25.3 kg/m  as calculated from the following:    Height as of this encounter: 1.765 m (5' 9.5\").    Weight as of this encounter: 78.8 kg (173 lb 12.8 oz).        He reports that he has never smoked. He has never used smokeless tobacco.      Appropriate preventive services were discussed with this patient, including applicable screening as appropriate for cardiovascular disease, diabetes, osteopenia/osteoporosis, and glaucoma.  As appropriate for age/gender, discussed screening for colorectal cancer, prostate cancer, breast cancer, and cervical cancer. Checklist reviewing preventive services available has been given to the patient.    Reviewed patients plan of care and provided an AVS. The  for Ion meets the Care Plan requirement. This Care Plan has been established and reviewed with the Patient.    Counseling Resources:  ATP IV Guidelines  Pooled Cohorts Equation Calculator  Breast Cancer Risk Calculator  Breast Cancer: Medication to Reduce Risk  FRAX Risk Assessment  ICSI Preventive Guidelines  Dietary Guidelines for Americans, 2010  makeena's MyPlate  ASA Prophylaxis  Lung CA Screening    Ion Lofton MD  [facility]    Identified Health Risks:  "

## 2021-08-18 NOTE — LETTER
August 19, 2021      Ion Guallpa .  [address] [place] [address]        Dear ,    We are writing to inform you of your test results.    Test results indicate you may require additional follow up, see comment below.    Resulted Orders   UA reflex to Microscopic and Culture   Result Value Ref Range    Color Urine Yellow Colorless, Straw, Light Yellow, Yellow    Appearance Urine Clear Clear    Glucose Urine Negative Negative mg/dL    Bilirubin Urine Negative Negative    Ketones Urine Negative Negative mg/dL    Specific Gravity Urine 1.020 1.005 - 1.030    Blood Urine Negative Negative    pH Urine 5.0 5.0 - 8.0    Protein Albumin Urine Negative Negative mg/dL    Urobilinogen Urine 0.2 0.2, 1.0 E.U./dL    Nitrite Urine Negative Negative    Leukocyte Esterase Urine Negative Negative    Narrative    Microscopic not indicated   CBC with platelets   Result Value Ref Range    WBC Count 6.1 4.0 - 11.0 10e3/uL    RBC Count 4.00 (L) 4.40 - 5.90 10e6/uL    Hemoglobin 12.4 (L) 13.3 - 17.7 g/dL    Hematocrit 37.0 (L) 40.0 - 53.0 %    MCV 93 78 - 100 fL    MCH 31.0 26.5 - 33.0 pg    MCHC 33.5 31.5 - 36.5 g/dL    RDW 13.5 10.0 - 15.0 %    Platelet Count 140 (L) 150 - 450 10e3/uL   Comprehensive metabolic panel   Result Value Ref Range    Sodium 142 136 - 145 mmol/L    Potassium 5.5 (H) 3.5 - 5.0 mmol/L    Chloride 111 (H) 98 - 107 mmol/L    Carbon Dioxide (CO2) 20 (L) 22 - 31 mmol/L    Anion Gap 11 5 - 18 mmol/L    Urea Nitrogen 53 (H) 8 - 28 mg/dL    Creatinine 3.06 (H) 0.70 - 1.30 mg/dL    Calcium 9.1 8.5 - 10.5 mg/dL    Glucose 103 70 - 125 mg/dL    Alkaline Phosphatase 86 45 - 120 U/L    AST 14 0 - 40 U/L    ALT <9 0 - 45 U/L    Protein Total 6.8 6.0 - 8.0 g/dL    Albumin 4.0 3.5 - 5.0 g/dL    Bilirubin Total 0.4 0.0 - 1.0 mg/dL    GFR Estimate 19 (L) >60 mL/min/1.73m2      Comment:      As of July 11, 2021, eGFR is calculated by the CKD-EPI creatinine equation, without race adjustment. eGFR can be  influenced by muscle mass, exercise, and diet. The reported eGFR is an estimation only and is only applicable if the renal function is stable.   Prostate Specific Antigen Screen   Result Value Ref Range    Prostate Specific Antigen Screen 1.25 0.00 - 6.50 ug/L     Macario your kidney function has declined a little bit (see the potassium, creatinine and BUN; I want you to share this with your kidney doctor who I think you are seeing in the next month or so; otherwise your tests look study and fine  If you have any questions or concerns, please call the clinic at the number listed above.       Sincerely,      Ion Lofton MD

## 2021-08-19 LAB — HCV AB SERPL QL IA: NONREACTIVE

## 2021-10-04 DIAGNOSIS — I10 BENIGN ESSENTIAL HYPERTENSION: Primary | ICD-10-CM

## 2021-10-05 RX ORDER — LISINOPRIL AND HYDROCHLOROTHIAZIDE 20; 25 MG/1; MG/1
1 TABLET ORAL DAILY
Qty: 90 TABLET | Refills: 3 | Status: SHIPPED | OUTPATIENT
Start: 2021-10-05 | End: 2023-03-15

## 2021-10-27 DIAGNOSIS — K21.00 GASTROESOPHAGEAL REFLUX DISEASE WITH ESOPHAGITIS: Primary | ICD-10-CM

## 2021-10-28 RX ORDER — FAMOTIDINE 20 MG/1
TABLET, FILM COATED ORAL
Qty: 180 TABLET | Refills: 3 | Status: SHIPPED | OUTPATIENT
Start: 2021-10-28 | End: 2023-08-31

## 2021-10-28 NOTE — TELEPHONE ENCOUNTER
"Disp Refills Start End TIFFANY    famotidine (PEPCID) 20 MG tablet 180 tablet 3 10/6/2020  No   Sig: TAKE 1 TABLET(20 MG) BY MOUTH TWICE DAILY   Sent to pharmacy as: famotidine 20 mg tablet (PEPCID)   E-Prescribing Status: Receipt confirmed by pharmacy (10/6/2020  3:52 PM CDT)       Last office visit provider:  8/18/21     Requested Prescriptions   Pending Prescriptions Disp Refills     famotidine (PEPCID) 20 MG tablet [Pharmacy Med Name: FAMOTIDINE 20MG TABLETS] 180 tablet      Sig: TAKE 1 TABLET(20 MG) BY MOUTH TWICE DAILY       H2 Blockers Protocol Passed - 10/27/2021  5:38 PM        Passed - Patient is age 12 or older        Passed - Recent (12 mo) or future (30 days) visit within the authorizing provider's specialty     Patient has had an office visit with the authorizing provider or a provider within the authorizing providers department within the previous 12 mos or has a future within next 30 days. See \"Patient Info\" tab in inbasket, or \"Choose Columns\" in Meds & Orders section of the refill encounter.              Passed - Medication is active on med list             Stanislav Alba RN 10/28/21 2:48 PM  "

## 2021-12-01 ENCOUNTER — TRANSFERRED RECORDS (OUTPATIENT)
Dept: HEALTH INFORMATION MANAGEMENT | Facility: CLINIC | Age: 76
End: 2021-12-01
Payer: COMMERCIAL

## 2022-05-19 ENCOUNTER — TRANSFERRED RECORDS (OUTPATIENT)
Dept: HEALTH INFORMATION MANAGEMENT | Facility: CLINIC | Age: 77
End: 2022-05-19
Payer: COMMERCIAL

## 2022-06-01 ENCOUNTER — TRANSFERRED RECORDS (OUTPATIENT)
Dept: HEALTH INFORMATION MANAGEMENT | Facility: CLINIC | Age: 77
End: 2022-06-01
Payer: COMMERCIAL

## 2022-09-01 ENCOUNTER — OFFICE VISIT (OUTPATIENT)
Dept: FAMILY MEDICINE | Facility: CLINIC | Age: 77
End: 2022-09-01
Payer: MEDICARE

## 2022-09-01 VITALS
DIASTOLIC BLOOD PRESSURE: 62 MMHG | OXYGEN SATURATION: 97 % | HEART RATE: 76 BPM | BODY MASS INDEX: 25.49 KG/M2 | SYSTOLIC BLOOD PRESSURE: 120 MMHG | WEIGHT: 175.1 LBS

## 2022-09-01 DIAGNOSIS — J30.2 SEASONAL ALLERGIC RHINITIS, UNSPECIFIED TRIGGER: ICD-10-CM

## 2022-09-01 DIAGNOSIS — I10 ESSENTIAL HYPERTENSION: ICD-10-CM

## 2022-09-01 DIAGNOSIS — N18.31 STAGE 3A CHRONIC KIDNEY DISEASE (H): ICD-10-CM

## 2022-09-01 DIAGNOSIS — Z00.00 ANNUAL PHYSICAL EXAM: Primary | ICD-10-CM

## 2022-09-01 DIAGNOSIS — Z12.5 ENCOUNTER FOR SCREENING FOR MALIGNANT NEOPLASM OF PROSTATE: ICD-10-CM

## 2022-09-01 LAB
ALBUMIN SERPL BCG-MCNC: 4.7 G/DL (ref 3.5–5.2)
ALBUMIN UR-MCNC: NEGATIVE MG/DL
ALP SERPL-CCNC: 82 U/L (ref 40–129)
ALT SERPL W P-5'-P-CCNC: 10 U/L (ref 10–50)
ANION GAP SERPL CALCULATED.3IONS-SCNC: 11 MMOL/L (ref 7–15)
APPEARANCE UR: CLEAR
AST SERPL W P-5'-P-CCNC: 23 U/L (ref 10–50)
BILIRUB SERPL-MCNC: 0.7 MG/DL
BILIRUB UR QL STRIP: NEGATIVE
BUN SERPL-MCNC: 26 MG/DL (ref 8–23)
CALCIUM SERPL-MCNC: 9.5 MG/DL (ref 8.8–10.2)
CHLORIDE SERPL-SCNC: 107 MMOL/L (ref 98–107)
COLOR UR AUTO: YELLOW
CREAT SERPL-MCNC: 2.56 MG/DL (ref 0.67–1.17)
DEPRECATED HCO3 PLAS-SCNC: 23 MMOL/L (ref 22–29)
ERYTHROCYTE [DISTWIDTH] IN BLOOD BY AUTOMATED COUNT: 13.7 % (ref 10–15)
GFR SERPL CREATININE-BSD FRML MDRD: 25 ML/MIN/1.73M2
GLUCOSE SERPL-MCNC: 101 MG/DL (ref 70–99)
GLUCOSE UR STRIP-MCNC: NEGATIVE MG/DL
HCT VFR BLD AUTO: 39.1 % (ref 40–53)
HGB BLD-MCNC: 13.2 G/DL (ref 13.3–17.7)
HGB UR QL STRIP: NEGATIVE
KETONES UR STRIP-MCNC: NEGATIVE MG/DL
LEUKOCYTE ESTERASE UR QL STRIP: NEGATIVE
MCH RBC QN AUTO: 31.2 PG (ref 26.5–33)
MCHC RBC AUTO-ENTMCNC: 33.8 G/DL (ref 31.5–36.5)
MCV RBC AUTO: 92 FL (ref 78–100)
NITRATE UR QL: NEGATIVE
PH UR STRIP: 5 [PH] (ref 5–8)
PLATELET # BLD AUTO: 141 10E3/UL (ref 150–450)
POTASSIUM SERPL-SCNC: 4.2 MMOL/L (ref 3.4–5.3)
PROT SERPL-MCNC: 7.4 G/DL (ref 6.4–8.3)
PSA SERPL-MCNC: 1.37 NG/ML (ref 0–6.5)
RBC # BLD AUTO: 4.23 10E6/UL (ref 4.4–5.9)
SODIUM SERPL-SCNC: 141 MMOL/L (ref 136–145)
SP GR UR STRIP: 1.02 (ref 1–1.03)
UROBILINOGEN UR STRIP-ACNC: 0.2 E.U./DL
WBC # BLD AUTO: 6.9 10E3/UL (ref 4–11)

## 2022-09-01 PROCEDURE — G0103 PSA SCREENING: HCPCS | Performed by: FAMILY MEDICINE

## 2022-09-01 PROCEDURE — 80053 COMPREHEN METABOLIC PANEL: CPT | Performed by: FAMILY MEDICINE

## 2022-09-01 PROCEDURE — 99213 OFFICE O/P EST LOW 20 MIN: CPT | Mod: 25 | Performed by: FAMILY MEDICINE

## 2022-09-01 PROCEDURE — 85027 COMPLETE CBC AUTOMATED: CPT | Performed by: FAMILY MEDICINE

## 2022-09-01 PROCEDURE — 36415 COLL VENOUS BLD VENIPUNCTURE: CPT | Performed by: FAMILY MEDICINE

## 2022-09-01 PROCEDURE — 99397 PER PM REEVAL EST PAT 65+ YR: CPT | Performed by: FAMILY MEDICINE

## 2022-09-01 PROCEDURE — 81003 URINALYSIS AUTO W/O SCOPE: CPT | Performed by: FAMILY MEDICINE

## 2022-09-01 RX ORDER — ERGOCALCIFEROL 1.25 MG/1
CAPSULE, LIQUID FILLED ORAL
COMMUNITY
Start: 2022-06-10 | End: 2023-08-15

## 2022-09-01 RX ORDER — TIMOLOL MALEATE 5 MG/ML
SOLUTION/ DROPS OPHTHALMIC
COMMUNITY
Start: 2021-09-09 | End: 2023-08-15

## 2022-09-01 RX ORDER — FLUTICASONE PROPIONATE 50 MCG
1 SPRAY, SUSPENSION (ML) NASAL DAILY
Qty: 1 G | Refills: 3 | Status: SHIPPED | OUTPATIENT
Start: 2022-09-01

## 2022-09-01 ASSESSMENT — ENCOUNTER SYMPTOMS: DIZZINESS: 1

## 2022-09-01 ASSESSMENT — ACTIVITIES OF DAILY LIVING (ADL): CURRENT_FUNCTION: NO ASSISTANCE NEEDED

## 2022-09-01 ASSESSMENT — PAIN SCALES - GENERAL: PAINLEVEL: NO PAIN (0)

## 2022-09-01 NOTE — LETTER
September 2, 2022      Ion Guallpa   [address] [place] [address]        Dear ,    We are writing to inform you of your test results.    Macario:Your test show slight improvement in  youe  kidney functions Which is good.    Resulted Orders   CBC with platelets   Result Value Ref Range    WBC Count 6.9 4.0 - 11.0 10e3/uL    RBC Count 4.23 (L) 4.40 - 5.90 10e6/uL    Hemoglobin 13.2 (L) 13.3 - 17.7 g/dL    Hematocrit 39.1 (L) 40.0 - 53.0 %    MCV 92 78 - 100 fL    MCH 31.2 26.5 - 33.0 pg    MCHC 33.8 31.5 - 36.5 g/dL    RDW 13.7 10.0 - 15.0 %    Platelet Count 141 (L) 150 - 450 10e3/uL   Comprehensive metabolic panel   Result Value Ref Range    Sodium 141 136 - 145 mmol/L    Potassium 4.2 3.4 - 5.3 mmol/L    Creatinine 2.56 (H) 0.67 - 1.17 mg/dL    Urea Nitrogen 26.0 (H) 8.0 - 23.0 mg/dL    Chloride 107 98 - 107 mmol/L    Carbon Dioxide (CO2) 23 22 - 29 mmol/L    Anion Gap 11 7 - 15 mmol/L    Glucose 101 (H) 70 - 99 mg/dL    Calcium 9.5 8.8 - 10.2 mg/dL    Protein Total 7.4 6.4 - 8.3 g/dL    Albumin 4.7 3.5 - 5.2 g/dL    Bilirubin Total 0.7 <=1.2 mg/dL    Alkaline Phosphatase 82 40 - 129 U/L    AST 23 10 - 50 U/L    ALT 10 10 - 50 U/L    GFR Estimate 25 (L) >60 mL/min/1.73m2      Comment:      Effective December 21, 2021 eGFRcr in adults is calculated using the 2021 CKD-EPI creatinine equation which includes age and gender (Koko et al., NEJM, DOI: 10.1056/BCAMcd8346075)   Prostate Specific Antigen Screen   Result Value Ref Range    Prostate Specific Antigen Screen 1.37 0.00 - 6.50 ng/mL    Narrative    This result is obtained using the Roche Elecsys total PSA method on the ezra e801 immunoassay analyzer. Results obtained with different assay methods or kits cannot be used interchangeably.   UA reflex to Microscopic and Culture   Result Value Ref Range    Color Urine Yellow Colorless, Straw, Light Yellow, Yellow    Appearance Urine Clear Clear    Glucose Urine Negative Negative mg/dL     Bilirubin Urine Negative Negative    Ketones Urine Negative Negative mg/dL    Specific Gravity Urine 1.020 1.005 - 1.030    Blood Urine Negative Negative    pH Urine 5.0 5.0 - 8.0    Protein Albumin Urine Negative Negative mg/dL    Urobilinogen Urine 0.2 0.2, 1.0 E.U./dL    Nitrite Urine Negative Negative    Leukocyte Esterase Urine Negative Negative    Narrative    Microscopic not indicated       If you have any questions or concerns, please call the clinic at the number listed above.       Sincerely,      Ion Lofton MD

## 2022-09-01 NOTE — PROGRESS NOTES
SUBJECTIVE:   Ion Guallpa Jr. is a 76 year old male who presents for Preventive Visit.  Chief complaint I am here for my physical; I am having difficulty with my sinuses; I need my medications refilled.    Patient has been advised of split billing requirements and indicates understanding: Yes  Are you in the first 12 months of your Medicare coverage?  No  History of present illness: Macario has been under my care for decades.  He currently is doing well with his chronic illnesses.  He does have chronic gout well managed with allopurinol.  Mild GERD and well managed with famotidine.  Benign essential hypertension well-controlled with lisinopril hydrochlorothiazide 20/25 once a day.  Previously he has had cataract surgery.  He is on vitamin C vitamin D2 replacement.  He has had a history of iron deficiency anemia and takes iron replacement for that.  He has had some seasonal allergic rhinitis symptoms he is seeing a sinus doctor who suggested a Tory pot.  I agree with this but he should add Flonase 1 spray into each nostril daily and I prescribed that for him here today.  Medication review was done.  He swims every day 30 to 45 minutes.  He keeps his weight down.  We will do know he does have a large indirect right inguinal hernia which is asymptomatic and is unchanged here today on exam complete exam was unremarkable except for some sun damage and he was referred to dermatology for a total mole screen.  All medical questions asked were answered I personally reviewed family social history surgeries allergies problems list.  Appropriate laboratory will be drawn today I will see him for follow-up 1 year.  Pleasure to see my old friend and patient again.  Healthy Habits:     In general, how would you rate your overall health?  Excellent    Frequency of exercise:  6-7 days/week    Duration of exercise:  Greater than 60 minutes    Do you usually eat at least 4 servings of fruit and vegetables a day, include whole grains    " & fiber and avoid regularly eating high fat or \"junk\" foods?  Yes    Taking medications regularly:  Yes    Medication side effects:  None    Ability to successfully perform activities of daily living:  No assistance needed    Home Safety:  No safety concerns identified    Hearing Impairment:  Difficult to understand a speaker at a public meeting or [employer] service    In the past 6 months, have you been bothered by leaking of urine?  No    In general, how would you rate your overall mental or emotional health?  Excellent      PHQ-2 Total Score: 0    Additional concerns today:  No    Do you feel safe in your environment? Yes    Have you ever done Advance Care Planning? (For example, a Health Directive, POLST, or a discussion with a medical provider or your loved ones about your wishes):       Fall risk  Fallen 2 or more times in the past year?: No  Any fall with injury in the past year?: No    Cognitive Screening   1) Repeat 3 items (Leader, Season, Table)    2) Clock draw: ABNORMAL wrong time   3) 3 item recall: Recalls 3 objects  Results: ABNORMAL clock, 1-2 items recalled: PROBABLE COGNITIVE IMPAIRMENT, **INFORM PROVIDER**    Mini-CogTM Copyright ODESSA Zhao. Licensed by the author for use in [facility]; reprinted with permission ([email or web address]). All rights reserved.      Do you have sleep apnea, excessive snoring or daytime drowsiness?: no    Reviewed and updated as needed this visit by clinical staff   Tobacco  Allergies  Meds                Reviewed and updated as needed this visit by Provider                   Social History     Tobacco Use     Smoking status: Never Smoker     Smokeless tobacco: Never Used   Substance Use Topics     Alcohol use: No     If you drink alcohol do you typically have >3 drinks per day or >7 drinks per week? No    Alcohol Use 9/1/2022   Prescreen: >3 drinks/day or >7 drinks/week? Not Applicable   Prescreen: >3 drinks/day or >7 drinks/week? -               Current " "providers sharing in care for this patient include:   Patient Care Team:  Ion Lofton MD as PCP - General  Ion Lofton MD as Assigned PCP    The following health maintenance items are reviewed in Epic and correct as of today:  Health Maintenance Due   Topic Date Due     MICROALBUMIN  Never done     DTAP/TDAP/TD IMMUNIZATION (1 - Tdap) Never done     Pneumococcal Vaccine: 65+ Years (1 - PCV) Never done     ZOSTER IMMUNIZATION (2 of 3) 08/06/2012     LIPID  06/13/2020     COVID-19 Vaccine (3 - Booster for Pfizer series) 08/31/2021     BMP  08/18/2022     ANNUAL REVIEW OF HM ORDERS  08/18/2022     INFLUENZA VACCINE (1) 09/01/2022     MEDICARE ANNUAL WELLNESS VISIT  08/18/2022     HEMOGLOBIN  08/18/2022     Lab work is in process          Review of Systems   Neurological: Positive for dizziness.   Psychiatric/Behavioral: Negative for mood changes.     Constitutional, HEENT, cardiovascular, pulmonary, GI, , musculoskeletal, neuro, skin, endocrine and psych systems are negative, except as otherwise noted.    OBJECTIVE:   /62 (BP Location: Left arm, Patient Position: Sitting, Cuff Size: Adult Regular)   Pulse 76   Wt 79.4 kg (175 lb 1.6 oz)   SpO2 97%   BMI 25.49 kg/m   Estimated body mass index is 25.49 kg/m  as calculated from the following:    Height as of 8/18/21: 1.765 m (5' 9.5\").    Weight as of this encounter: 79.4 kg (175 lb 1.6 oz).  Physical Exam  GENERAL: healthy, alert and no distress  EYES: Eyes grossly normal to inspection, PERRL and conjunctivae and sclerae normal  HENT: ear canals and TM's normal, nose and mouth without ulcers or lesions  NECK: no adenopathy, no asymmetry, masses, or scars and thyroid normal to palpation  RESP: lungs clear to auscultation - no rales, rhonchi or wheezes  CV: regular rate and rhythm, normal S1 S2, no S3 or S4, no murmur, click or rub, no peripheral edema and peripheral pulses strong  ABDOMEN: soft, nontender, no hepatosplenomegaly, no masses and bowel " "sounds normal  MS: no gross musculoskeletal defects noted, no edema  SKIN: no suspicious lesions or rashes  NEURO: Normal strength and tone, mentation intact and speech normal  PSYCH: mentation appears normal, affect normal/bright        ASSESSMENT / PLAN:       ICD-10-CM    1. Annual physical exam  Z00.00 CBC with platelets     Comprehensive metabolic panel     Prostate Specific Antigen Screen     UA reflex to Microscopic and Culture     CBC with platelets     Comprehensive metabolic panel     Prostate Specific Antigen Screen     UA reflex to Microscopic and Culture   2. Essential hypertension  I10 CBC with platelets     Comprehensive metabolic panel     Prostate Specific Antigen Screen     UA reflex to Microscopic and Culture     CBC with platelets     Comprehensive metabolic panel     Prostate Specific Antigen Screen     UA reflex to Microscopic and Culture   3. Stage 3a chronic kidney disease (H)  N18.31 Comprehensive metabolic panel     UA reflex to Microscopic and Culture     Comprehensive metabolic panel     UA reflex to Microscopic and Culture   4. Encounter for screening for malignant neoplasm of prostate   Z12.5 Prostate Specific Antigen Screen     Adult Dermatology Referral     Prostate Specific Antigen Screen   5. Seasonal allergic rhinitis, unspecified trigger  J30.2 fluticasone (FLONASE) 50 MCG/ACT nasal spray       Patient has been advised of split billing requirements and indicates understanding: Yes    COUNSELING:      Estimated body mass index is 25.49 kg/m  as calculated from the following:    Height as of 8/18/21: 1.765 m (5' 9.5\").    Weight as of this encounter: 79.4 kg (175 lb 1.6 oz).        He reports that he has never smoked. He has never used smokeless tobacco.      Appropriate preventive services were discussed with this patient, including applicable screening as appropriate for cardiovascular disease, diabetes, osteopenia/osteoporosis, and glaucoma.  As appropriate for age/gender, " discussed screening for colorectal cancer, prostate cancer, breast cancer, and cervical cancer. Checklist reviewing preventive services available has been given to the patient.    Reviewed patients plan of care and provided an AVS. The Basic Care Plan (routine screening as documented in Health Maintenance) for Ion meets the Care Plan requirement. This Care Plan has been established and reviewed with the Patient.    Counseling Resources:  ATP IV Guidelines  Pooled Cohorts Equation Calculator  Breast Cancer Risk Calculator  Breast Cancer: Medication to Reduce Risk  FRAX Risk Assessment  ICSI Preventive Guidelines  Dietary Guidelines for Americans, 2010  USDA's MyPlate  ASA Prophylaxis  Lung CA Screening    Ion Lofton MD  [facility]    Identified Health Risks:

## 2022-09-09 ENCOUNTER — TELEPHONE (OUTPATIENT)
Dept: FAMILY MEDICINE | Facility: CLINIC | Age: 77
End: 2022-09-09

## 2022-09-09 NOTE — TELEPHONE ENCOUNTER
Pt called to let Dr. Lofton know about a class reunion/get together tomorrow 9/10/22 at [facility] in [place] from 2-5.

## 2022-12-14 ENCOUNTER — TRANSFERRED RECORDS (OUTPATIENT)
Dept: HEALTH INFORMATION MANAGEMENT | Facility: CLINIC | Age: 77
End: 2022-12-14

## 2023-01-10 ENCOUNTER — OFFICE VISIT (OUTPATIENT)
Dept: DERMATOLOGY | Facility: CLINIC | Age: 78
End: 2023-01-10
Payer: COMMERCIAL

## 2023-01-10 DIAGNOSIS — Z12.5 ENCOUNTER FOR SCREENING FOR MALIGNANT NEOPLASM OF PROSTATE: ICD-10-CM

## 2023-01-10 DIAGNOSIS — L81.4 LENTIGO: ICD-10-CM

## 2023-01-10 DIAGNOSIS — L82.1 SEBORRHEIC KERATOSIS: Primary | ICD-10-CM

## 2023-01-10 DIAGNOSIS — D23.9 DERMAL NEVUS: ICD-10-CM

## 2023-01-10 DIAGNOSIS — D18.01 ANGIOMA OF SKIN: ICD-10-CM

## 2023-01-10 PROCEDURE — 99243 OFF/OP CNSLTJ NEW/EST LOW 30: CPT | Performed by: DERMATOLOGY

## 2023-01-10 ASSESSMENT — PAIN SCALES - GENERAL: PAINLEVEL: NO PAIN (0)

## 2023-01-10 NOTE — PROGRESS NOTES
Ion Guallpa . , a 77 year old year old male patient, I was asked to see by Dr. Irby for spots on skin.He denies any new or changing skin lesions.  Patient has no other skin complaints today.  Remainder of the HPI, Meds, PMH, Allergies, FH, and SH was reviewed in chart.    No past medical history on file.    Past Surgical History:   Procedure Laterality Date     EYE SURGERY      cataract     EYE SURGERY       PHACOEMULSIFICATION CLEAR CORNEA WITH STANDARD INTRAOCULAR LENS IMPLANT  4/5/2012    Procedure:PHACOEMULSIFICATION CLEAR CORNEA WITH STANDARD INTRAOCULAR LENS IMPLANT; RIGH TPHACOEMULSIFICATION CLEAR CORNEA WITH STANDARD INTRAOCULAR LENS IMPLANT ; Surgeon:EDITA JUNG; Location:[facility]        Family History   Problem Relation Age of Onset     Cancer Father         lung     Cancer Brother         colon CA with mets to liver       Social History     Socioeconomic History     Marital status: Single     Spouse name: Not on file     Number of children: Not on file     Years of education: Not on file     Highest education level: Not on file   Occupational History     Not on file   Tobacco Use     Smoking status: Never     Smokeless tobacco: Never   Substance and Sexual Activity     Alcohol use: No     Drug use: No     Sexual activity: Not on file   Other Topics Concern     Parent/sibling w/ CABG, MI or angioplasty before 65F 55M? Not Asked   Social History Narrative     Not on file     Social Determinants of Health     Financial Resource Strain: Not on file   Food Insecurity: Not on file   Transportation Needs: Not on file   Physical Activity: Not on file   Stress: Not on file   Social Connections: Not on file   Intimate Partner Violence: Not on file   Housing Stability: Not on file       Outpatient Encounter Medications as of 1/10/2023   Medication Sig Dispense Refill     acetaminophen (TYLENOL) 500 MG tablet Take 500 mg by mouth       allopurinol (ZYLOPRIM) 100 MG tablet Take 200 mg by mouth daily        aspirin (ASA) 81 MG EC tablet Take 81 mg by mouth       famotidine (PEPCID) 20 MG tablet TAKE 1 TABLET(20 MG) BY MOUTH TWICE DAILY 180 tablet 3     ferrous sulfate (FEROSUL) 325 (65 Fe) MG tablet Take 325 mg by mouth       fluticasone (FLONASE) 50 MCG/ACT nasal spray Spray 1 spray into both nostrils daily 1 g 3     lisinopril-hydrochlorothiazide (ZESTORETIC) 20-25 MG tablet TAKE 1 TABLET BY MOUTH DAILY 90 tablet 3     timolol maleate (TIMOPTIC) 0.5 % ophthalmic solution INSTILL 1 DROP IN LEFT EYE TWICE DAILY       vitamin C (ASCORBIC ACID) 500 MG tablet Take 500 mg by mouth       vitamin D2 (ERGOCALCIFEROL) 87193 units (1250 mcg) capsule TAKE 1 CAPSULE BY MOUTH 1 TIME EVERY WEEK FOR 12 WEEKS THEN STOP AND TAKE OTC 4000 UNITS VIT EVERY DAY       No facility-administered encounter medications on file as of 1/10/2023.             Review Of Systems  Skin: As above  Eyes: negative  Ears/Nose/Throat: negative  Respiratory: No shortness of breath, dyspnea on exertion, cough, or hemoptysis  Cardiovascular: negative  Gastrointestinal: negative  Genitourinary: negative  Musculoskeletal: negative  Neurologic: negative  Psychiatric: negative  Hematologic/Lymphatic/Immunologic: negative  Endocrine: negative      O:   NAD, WDWN, Alert & Oriented, Mood & Affect wnl, Vitals stable   Here today alone   General appearance joselo ii   Vitals stable   Alert, oriented and in no acute distress      Stuck on papules and brown macules on trunk and ext    Red papules on trunk   Flesh colored papules on trunk      The remainder of the full exam was normal; the following areas were examined:  conjunctiva/lids, oral mucosa, neck, peripheral vascular system, abdomen, lymph nodes, digits/nails, eccrine and apocrine glands, scalp/hair, face, neck, chest, abdomen, buttocks, back, RUE, LUE, RLE, LLE       Eyes: Conjunctivae/lids:Normal     ENT: Lips, buccal mucosa, tongue: normal    MSK:Normal    Cardiovascular: peripheral edema none    Pulm:  Breathing Normal    Lymph Nodes: No Head and Neck Lymphadenopathy     Neuro/Psych: Orientation:Normal; Mood/Affect:Normal      A/P:  1. Seborrheic keratosis, lentigo, angioma, dermal nevus  It was a pleasure speaking to Ion Guallpa Jr. today.  Previous clinic  notes and pertinent laboratory tests were reviewed prior to Ion Guallpa Jr.'s visit.  Nature of benign skin lesions dicussed with patient.  Patient encouraged to perform monthly skin exams.  UV precautions reviewed with patient.  Return to clinic 12 months

## 2023-01-10 NOTE — LETTER
1/10/2023         RE: Ion Guallpa Jr.  [address] [place] [address]        Dear Colleague,    Thank you for referring your patient, Ion Guallpa Jr., to [facility]. Please see a copy of my visit note below.    Ion Guallpa Jr. , a 77 year old year old male patient, I was asked to see by Dr. Irby for spots on skin.He denies any new or changing skin lesions.  Patient has no other skin complaints today.  Remainder of the HPI, Meds, PMH, Allergies, FH, and SH was reviewed in chart.    No past medical history on file.    Past Surgical History:   Procedure Laterality Date     EYE SURGERY      cataract     EYE SURGERY       PHACOEMULSIFICATION CLEAR CORNEA WITH STANDARD INTRAOCULAR LENS IMPLANT  4/5/2012    Procedure:PHACOEMULSIFICATION CLEAR CORNEA WITH STANDARD INTRAOCULAR LENS IMPLANT; RIGH TPHACOEMULSIFICATION CLEAR CORNEA WITH STANDARD INTRAOCULAR LENS IMPLANT ; Surgeon:EDITA JUNG; Location[facility]        Family History   Problem Relation Age of Onset     Cancer Father         lung     Cancer Brother         colon CA with mets to liver       Social History     Socioeconomic History     Marital status: Single     Spouse name: Not on file     Number of children: Not on file     Years of education: Not on file     Highest education level: Not on file   Occupational History     Not on file   Tobacco Use     Smoking status: Never     Smokeless tobacco: Never   Substance and Sexual Activity     Alcohol use: No     Drug use: No     Sexual activity: Not on file   Other Topics Concern     Parent/sibling w/ CABG, MI or angioplasty before 65F 55M? Not Asked   Social History Narrative     Not on file     Social Determinants of Health     Financial Resource Strain: Not on file   Food Insecurity: Not on file   Transportation Needs: Not on file   Physical Activity: Not on file   Stress: Not on file   Social Connections: Not on file   Intimate Partner Violence: Not on file    Housing Stability: Not on file       Outpatient Encounter Medications as of 1/10/2023   Medication Sig Dispense Refill     acetaminophen (TYLENOL) 500 MG tablet Take 500 mg by mouth       allopurinol (ZYLOPRIM) 100 MG tablet Take 200 mg by mouth daily       aspirin (ASA) 81 MG EC tablet Take 81 mg by mouth       famotidine (PEPCID) 20 MG tablet TAKE 1 TABLET(20 MG) BY MOUTH TWICE DAILY 180 tablet 3     ferrous sulfate (FEROSUL) 325 (65 Fe) MG tablet Take 325 mg by mouth       fluticasone (FLONASE) 50 MCG/ACT nasal spray Spray 1 spray into both nostrils daily 1 g 3     lisinopril-hydrochlorothiazide (ZESTORETIC) 20-25 MG tablet TAKE 1 TABLET BY MOUTH DAILY 90 tablet 3     timolol maleate (TIMOPTIC) 0.5 % ophthalmic solution INSTILL 1 DROP IN LEFT EYE TWICE DAILY       vitamin C (ASCORBIC ACID) 500 MG tablet Take 500 mg by mouth       vitamin D2 (ERGOCALCIFEROL) 64800 units (1250 mcg) capsule TAKE 1 CAPSULE BY MOUTH 1 TIME EVERY WEEK FOR 12 WEEKS THEN STOP AND TAKE OTC 4000 UNITS VIT EVERY DAY       No facility-administered encounter medications on file as of 1/10/2023.             Review Of Systems  Skin: As above  Eyes: negative  Ears/Nose/Throat: negative  Respiratory: No shortness of breath, dyspnea on exertion, cough, or hemoptysis  Cardiovascular: negative  Gastrointestinal: negative  Genitourinary: negative  Musculoskeletal: negative  Neurologic: negative  Psychiatric: negative  Hematologic/Lymphatic/Immunologic: negative  Endocrine: negative      O:   NAD, WDWN, Alert & Oriented, Mood & Affect wnl, Vitals stable   Here today alone   General appearance joselo ii   Vitals stable   Alert, oriented and in no acute distress      Stuck on papules and brown macules on trunk and ext    Red papules on trunk   Flesh colored papules on trunk      The remainder of the full exam was normal; the following areas were examined:  conjunctiva/lids, oral mucosa, neck, peripheral vascular system, abdomen, lymph nodes,  digits/nails, eccrine and apocrine glands, scalp/hair, face, neck, chest, abdomen, buttocks, back, RUE, LUE, RLE, LLE       Eyes: Conjunctivae/lids:Normal     ENT: Lips, buccal mucosa, tongue: normal    MSK:Normal    Cardiovascular: peripheral edema none    Pulm: Breathing Normal    Lymph Nodes: No Head and Neck Lymphadenopathy     Neuro/Psych: Orientation:Normal; Mood/Affect:Normal      A/P:  1. Seborrheic keratosis, lentigo, angioma, dermal nevus  It was a pleasure speaking to Ion Guallpa Jr. today.  Previous clinic  notes and pertinent laboratory tests were reviewed prior to Ion Guallpa Jr.'s visit.  Nature of benign skin lesions dicussed with patient.  Patient encouraged to perform monthly skin exams.  UV precautions reviewed with patient.  Return to clinic 12 months        Again, thank you for allowing me to participate in the care of your patient.        Sincerely,        Javier Araujo MD

## 2023-03-14 DIAGNOSIS — I10 BENIGN ESSENTIAL HYPERTENSION: ICD-10-CM

## 2023-03-15 RX ORDER — LISINOPRIL AND HYDROCHLOROTHIAZIDE 20; 25 MG/1; MG/1
1 TABLET ORAL DAILY
Qty: 90 TABLET | Refills: 3 | Status: SHIPPED | OUTPATIENT
Start: 2023-03-15

## 2023-03-15 NOTE — TELEPHONE ENCOUNTER
"Routing refill request to provider for review/approval because:  Labs out of range:  cr    Last Written Prescription Date:  10/5/21  Last Fill Quantity: 90,  # refills: 3   Last office visit provider:  9/1/22     Requested Prescriptions   Pending Prescriptions Disp Refills     lisinopril-hydrochlorothiazide (ZESTORETIC) 20-25 MG tablet [Pharmacy Med Name: LISINOPRIL-HCTZ 20/25MG TABLETS] 90 tablet 3     Sig: TAKE 1 TABLET BY MOUTH DAILY       Diuretics (Including Combos) Protocol Failed - 3/14/2023  2:18 PM        Failed - Normal serum creatinine on file in past 12 months     Recent Labs   Lab Test 09/01/22  1356   CR 2.56*              Passed - Blood pressure under 140/90 in past 12 months     BP Readings from Last 3 Encounters:   09/01/22 120/62   08/18/21 106/68   08/13/20 126/60                 Passed - Recent (12 mo) or future (30 days) visit within the authorizing provider's specialty     Patient has had an office visit with the authorizing provider or a provider within the authorizing providers department within the previous 12 mos or has a future within next 30 days. See \"Patient Info\" tab in inbasket, or \"Choose Columns\" in Meds & Orders section of the refill encounter.              Passed - Medication is active on med list        Passed - Patient is age 18 or older        Passed - Normal serum potassium on file in past 12 months     Recent Labs   Lab Test 09/01/22  1356   POTASSIUM 4.2                    Passed - Normal serum sodium on file in past 12 months     Recent Labs   Lab Test 09/01/22  1356                ACE Inhibitors (Including Combos) Protocol Failed - 3/14/2023  2:18 PM        Failed - Normal serum creatinine on file in past 12 months     Recent Labs   Lab Test 09/01/22  1356   CR 2.56*       Ok to refill medication if creatinine is low          Passed - Blood pressure under 140/90 in past 12 months     BP Readings from Last 3 Encounters:   09/01/22 120/62   08/18/21 106/68   08/13/20 " "126/60                 Passed - Recent (12 mo) or future (30 days) visit within the authorizing provider's specialty     Patient has had an office visit with the authorizing provider or a provider within the authorizing providers department within the previous 12 mos or has a future within next 30 days. See \"Patient Info\" tab in inbasket, or \"Choose Columns\" in Meds & Orders section of the refill encounter.              Passed - Medication is active on med list        Passed - Patient is age 18 or older        Passed - Normal serum potassium on file in past 12 months     Recent Labs   Lab Test 09/01/22  1356   POTASSIUM 4.2                  Chen Bentley, RN 03/15/23 11:53 AM  "

## 2023-03-20 ENCOUNTER — NURSE TRIAGE (OUTPATIENT)
Dept: NURSING | Facility: CLINIC | Age: 78
End: 2023-03-20
Payer: MEDICARE

## 2023-03-20 NOTE — TELEPHONE ENCOUNTER
[facility] calling to check on prescription that was to be sent to them last week. Per chart review, lisinopril-hydrochlorothiazide 20-25mg tablets sent electronically on 3/15/2023. Isaura has not received it. Pharmacist took verbal order over the phone           Reason for Disposition    Prescription prescribed recently is not at pharmacy and triager has access to patient's EMR and prescription is recorded in the EMR    Protocols used: MEDICATION QUESTION CALL-A-[place]      Lesly Chadwick RN [facility] March 20, 2023 3:13 PM

## 2023-04-27 ENCOUNTER — TRANSFERRED RECORDS (OUTPATIENT)
Dept: HEALTH INFORMATION MANAGEMENT | Facility: CLINIC | Age: 78
End: 2023-04-27
Payer: MEDICARE

## 2023-07-07 ENCOUNTER — TRANSFERRED RECORDS (OUTPATIENT)
Dept: HEALTH INFORMATION MANAGEMENT | Facility: CLINIC | Age: 78
End: 2023-07-07
Payer: MEDICARE

## 2023-07-07 LAB
CREATININE (EXTERNAL): 2.65 MG/DL (ref 0.7–1.28)
GFR ESTIMATED (EXTERNAL): 24 ML/MIN/1.73M2
GLUCOSE (EXTERNAL): 92 MG/DL (ref 65–99)
POTASSIUM (EXTERNAL): 4.3 MMOL/L (ref 3.5–5.3)

## 2023-07-18 ENCOUNTER — OFFICE VISIT (OUTPATIENT)
Dept: FAMILY MEDICINE | Facility: CLINIC | Age: 78
End: 2023-07-18
Payer: MEDICARE

## 2023-07-18 VITALS
RESPIRATION RATE: 16 BRPM | HEART RATE: 76 BPM | OXYGEN SATURATION: 99 % | WEIGHT: 173.2 LBS | DIASTOLIC BLOOD PRESSURE: 60 MMHG | TEMPERATURE: 98.1 F | BODY MASS INDEX: 25.65 KG/M2 | HEIGHT: 69 IN | SYSTOLIC BLOOD PRESSURE: 120 MMHG

## 2023-07-18 DIAGNOSIS — N18.31 ANEMIA DUE TO STAGE 3A CHRONIC KIDNEY DISEASE (H): ICD-10-CM

## 2023-07-18 DIAGNOSIS — D63.1 ANEMIA DUE TO STAGE 3A CHRONIC KIDNEY DISEASE (H): ICD-10-CM

## 2023-07-18 DIAGNOSIS — R00.1 BRADYCARDIA: Primary | ICD-10-CM

## 2023-07-18 PROCEDURE — 99214 OFFICE O/P EST MOD 30 MIN: CPT | Performed by: FAMILY MEDICINE

## 2023-07-18 RX ORDER — VITAMIN B COMPLEX
25 TABLET ORAL DAILY
COMMUNITY
End: 2023-08-15

## 2023-07-18 RX ORDER — FERROUS SULFATE 325(65) MG
325 TABLET ORAL
Qty: 90 TABLET | Refills: 3 | Status: SHIPPED | OUTPATIENT
Start: 2023-07-18

## 2023-07-18 ASSESSMENT — PAIN SCALES - GENERAL: PAINLEVEL: NO PAIN (0)

## 2023-07-18 NOTE — PROGRESS NOTES
"  Assessment & Plan     Bradycardia  Patient has persistent resting pulse of 38-40; after exercise pulse only gets to 68; he does get a little bit lightheaded and is requiring a nap; he does have stage III CKD which appears to be getting worse I am concerned about his recent EKG here which shows a resting pulse of 41 with PVCs; he may well benefit from a pacemaker; he does have first-degree AV block  - EKG 12-lead, tracing only  - Adult Cardiology [facility] Referral  - ferrous sulfate (FEROSUL) 325 (65 Fe) MG tablet  Dispense: 90 tablet; Refill: 3    Anemia due to stage 3a chronic kidney disease (H)  Patient has anemia chronically and we will continue him on ferrous sulfate  - ferrous sulfate (FEROSUL) 325 (65 Fe) MG tablet  Dispense: 90 tablet; Refill: 3               BMI:   Estimated body mass index is 25.58 kg/m  as calculated from the following:    Height as of this encounter: 1.753 m (5' 9\").    Weight as of this encounter: 78.6 kg (173 lb 3.2 oz).       Regular exercise    Ion Lofton MD  [facility]   Ion is a 77 year old, presenting for the following health issues:  Recheck Medication        7/18/2023     9:42 AM   Additional Questions   Roomed by Yue Reed   Accompanied by Self     HPI Macario was him to see his kidney doctor last week and his pulse was 38; we see him here today with a resting pulse of 41 and now some new findings of PVCs.  He does have first-degree AV block.  I think he would benefit from pacemaker evaluation and we will send him to cardiology.  He does have stage III kidney disease which is worsening and I am concerned about his pump output.  He is now admitting that he does get a little bit lightheaded now and then.  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  We will go along with a definitive cardiology work-up.  He agrees            Review of Systems   Constitutional, HEENT, cardiovascular, pulmonary, gi " "and gu systems are negative, except as otherwise noted.      Objective    /60   Pulse 76   Temp 98.1  F (36.7  C) (Temporal)   Resp 16   Ht 1.753 m (5' 9\")   Wt 78.6 kg (173 lb 3.2 oz)   SpO2 99%   BMI 25.58 kg/m    Body mass index is 25.58 kg/m .  Physical Exam   GENERAL: healthy, alert and no distress  NECK: no adenopathy, no asymmetry, masses, or scars and thyroid normal to palpation  RESP: lungs clear to auscultation - no rales, rhonchi or wheezes  CV: Pulse was at 41 he does have an occasional PVC, no S3 or S4, no murmur, click or rub, no peripheral edema and peripheral pulses strong  ABDOMEN: soft, nontender, no hepatosplenomegaly, no masses and bowel sounds normal  MS: no gross musculoskeletal defects noted, no edema                    "

## 2023-08-15 ENCOUNTER — OFFICE VISIT (OUTPATIENT)
Dept: CARDIOLOGY | Facility: CLINIC | Age: 78
End: 2023-08-15
Attending: FAMILY MEDICINE
Payer: COMMERCIAL

## 2023-08-15 VITALS
SYSTOLIC BLOOD PRESSURE: 108 MMHG | RESPIRATION RATE: 14 BRPM | HEART RATE: 48 BPM | BODY MASS INDEX: 25.55 KG/M2 | DIASTOLIC BLOOD PRESSURE: 58 MMHG | WEIGHT: 173 LBS

## 2023-08-15 DIAGNOSIS — R00.1 BRADYCARDIA: ICD-10-CM

## 2023-08-15 PROCEDURE — 99244 OFF/OP CNSLTJ NEW/EST MOD 40: CPT | Performed by: INTERNAL MEDICINE

## 2023-08-15 RX ORDER — CHOLECALCIFEROL (VITAMIN D3) 50 MCG
2 TABLET ORAL DAILY
COMMUNITY

## 2023-08-15 NOTE — LETTER
8/15/2023    Ion Lofton MD  [address]  [facility] [address]    RE: Ion Guallpa Jr.       Dear Colleague,     I had the pleasure of seeing Ion Guallpa Jr. in the [facility].     [facility]  Cardiac Electrophysiology  [address]  [place] [address]   Office: [phone]  Fax: [phone]     Cardiac Electrophysiology Consultation    Patient: Ion Guallpa Jr.   : 1945     Referring Provider: Ion Lofton MD  Primary Care Provider: Ion Lofton MD    CHIEF COMPLAINT/REASON FOR CONSULTATION  Sinus node dysfunction    Assessment/Recommendations   Ion Guallpa Jr. is a 77 year old male with sinus bradycardia, CKD referred by Dr. Lofton for consultation regarding sinus bradycardia.    Sinus bradycardia - long history of resting bradycardia, largely asymptomatic with very good functional status including robust exertional capacity.  Objective chronotropic response unknown.    Based on the above, pacemaker implantation would not appear to be indicated at present.  We will plan for cardiac event monitoring to assess for critical subclinical bradyarrhythmia    Follow up: pending results above         History of Present Illness   Ion Guallpa Jr. is a 77 year old male with sinus bradycardia, CKD referred by Dr. Lofton for consultation regarding sinus bradycardia.    Mr. Guallpa has noted a long history of resting HR 50-60bpm via self pulse assessments going back many years.  He has been noted to have have resting heart rates 30-40s reported at recent clinic visits.  He has run 14 marathons between 6468-2849.  He continues to be active with swimming nearly 1mi/day and light weightlifting (he recently had a hiatus due to lower back injury) - he denies exertional limitation.  He notes self-check HR around 80bpm after swimming.  He denies chest pain, palpitations, presyncope, syncope.  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       Physical Examination  Review of Systems   VITALS: /58 (BP Location: Right arm, Patient Position: Sitting, Cuff Size: Adult Regular)   Pulse (!) 48   Resp 14   Wt 78.5 kg (173 lb)   BMI 25.55 kg/m    Wt Readings from Last 3 Encounters:   07/18/23 78.6 kg (173 lb 3.2 oz)   09/01/22 79.4 kg (175 lb 1.6 oz)   08/18/21 78.8 kg (173 lb 12.8 oz)     CONSTITUTIONAL: well nourished, comfortable, no distress  EYES:  Conjunctivae pink, sclerae clear.    E/N/T:  Oral mucosa pink  RESPIRATORY:  Respiratory effort is normal  CARDIOVASCULAR:  regular, bradycardic, normal S1 and S2  GASTROINTESTINAL:  Abdomen without masses or tenderness  EXTREMITIES:  No clubbing or cyanosis.    MUSCULOSKELETAL:  Overall grossly normal muscle strength  SKIN:  Overall, skin warm and dry, no lesions.  NEURO/PSYCH:  Oriented x 3 with normal affect.   Constitutional:  No weight loss or loss of appetite    Eyes:  No difficulty with vision, no double vision, no dry eyes  ENT:  No sore throat, difficulty swallowing; changes in hearing or tinnitus  Cardiovascular: As detailed above  Respiratory:  No cough  Musculoskeletal  No joint pain, muscle aches  Neurologic:  No syncope, lightheadedness, fainting spells   Hematologic: No easy bruising, excessive bleeding tendency   Gastrointestinal:  No jaundice, abdominal pain or abdominal bloating  Genitourinary: No changes in urinary habits, no trouble urinating    Psychiatric: No anxiety or depression      Medical History  Surgical History   No past medical history on file. Past Surgical History:   Procedure Laterality Date    EYE SURGERY      cataract    EYE SURGERY      PHACOEMULSIFICATION CLEAR CORNEA WITH STANDARD INTRAOCULAR LENS IMPLANT  4/5/2012    Procedure:PHACOEMULSIFICATION CLEAR CORNEA WITH STANDARD INTRAOCULAR LENS IMPLANT; RIG TPHACOEMULSIFICATION CLEAR CORNEA WITH STANDARD INTRAOCULAR LENS IMPLANT ; Surgeon:EDITA JUNG; Location:[facility]    Left knee arthroscopic surgery      Family History Social History   Family History   Problem Relation Age of Onset    Cancer Father         lung    Cancer Brother         colon CA with mets to liver        Social History     Tobacco Use    Smoking status: Never    Smokeless tobacco: Never   Substance Use Topics    Alcohol use: No    Drug use: No         Medications  Allergies     Current Outpatient Medications:     acetaminophen (TYLENOL) 500 MG tablet, Take 500 mg by mouth, Disp: , Rfl:     allopurinol (ZYLOPRIM) 100 MG tablet, Take 200 mg by mouth daily, Disp: , Rfl:     aspirin (ASA) 81 MG EC tablet, Take 81 mg by mouth, Disp: , Rfl:     famotidine (PEPCID) 20 MG tablet, TAKE 1 TABLET(20 MG) BY MOUTH TWICE DAILY, Disp: 180 tablet, Rfl: 3    ferrous sulfate (FEROSUL) 325 (65 Fe) MG tablet, Take 1 tablet (325 mg) by mouth daily (with breakfast), Disp: 90 tablet, Rfl: 3    fluticasone (FLONASE) 50 MCG/ACT nasal spray, Spray 1 spray into both nostrils daily, Disp: 1 g, Rfl: 3    lisinopril-hydrochlorothiazide (ZESTORETIC) 20-25 MG tablet, TAKE 1 TABLET BY MOUTH DAILY, Disp: 90 tablet, Rfl: 3    timolol maleate (TIMOPTIC) 0.5 % ophthalmic solution, INSTILL 1 DROP IN LEFT EYE TWICE DAILY, Disp: , Rfl:     vitamin C (ASCORBIC ACID) 500 MG tablet, Take 500 mg by mouth (Patient not taking: Reported on 7/18/2023), Disp: , Rfl:     vitamin D2 (ERGOCALCIFEROL) 71048 units (1250 mcg) capsule, TAKE 1 CAPSULE BY MOUTH 1 TIME EVERY WEEK FOR 12 WEEKS THEN STOP AND TAKE OTC 4000 UNITS VIT EVERY DAY (Patient not taking: Reported on 7/18/2023), Disp: , Rfl:     Vitamin D3 (CHOLECALCIFEROL) 25 mcg (1000 units) tablet, Take 25 mcg by mouth daily, Disp: , Rfl:    No Known Allergies       Lab Results    Chemistry CBC Cardiac Enzymes/BNP/TSH/INR   Recent Labs   Lab Test 09/01/22  1356      POTASSIUM 4.2   CHLORIDE 107   CO2 23   *   BUN 26.0*   CR 2.56*   GFRESTIMATED 25*   RHIANNON 9.5     Recent Labs   Lab Test 09/01/22  1356 08/18/21  1145 08/13/20  1415    CR 2.56* 3.06* 2.94*          Recent Labs   Lab Test 09/01/22  1356   WBC 6.9   HGB 13.2*   HCT 39.1*   MCV 92   *     Recent Labs   Lab Test 09/01/22  1356 08/18/21  1145 08/13/20  1415   HGB 13.2* 12.4* 13.7*    No results for input(s): TROPONINI in the last 40351 hours.  No results for input(s): BNP, NTBNPI, NTBNP in the last 04855 hours.  No results for input(s): TSH in the last 36550 hours.  No results for input(s): INR in the last 59149 hours.      Data Review    ECGs (tracings independently reviewed)  5/18/2020 - SB 53bpm  6/13/2019 - SB 52bpm (report only)  5/19/2017 - SB 54bpm (report only)       Cc: Ion Jerome MD  8/15/2023  12:54 PM      Thank you for allowing me to participate in the care of your patient.      Sincerely,     Tiny Jerome MD     [facility]  cc:   Ion Lofton MD  [address] [address]

## 2023-08-15 NOTE — PATIENT INSTRUCTIONS
[facility]  Cardiac Electrophysiology  [address]  [place] [address]   Office: [phone]  Fax: [phone]       Thank you for seeing us in clinic today - it is a pleasure to be a part of your care team.  Below is a summary of our plan from today's visit.      You have had a long history of low resting heart rates, though this has not correlated to any symptoms - conversely, you have continued to be very active including swimming 6 days per week without functional limitation.    We will plan for the following:  - 14 day cardiac rhythm monitor to assess for more significant (and potential dangerous) bradyarrhythmias (slow heart rhythms)    Please do not hesitate to be in touch with our office at [phone] with any questions that may arise.      Thank you for trusting us with your care,    Tiny Jerome MD  Clinical Cardiac Electrophysiology  [facility]  [address]  [place] [address]   Office: [phone]  Fax: [phone]

## 2023-08-15 NOTE — PROGRESS NOTES
[facility]  Cardiac Electrophysiology  [address]  [place] [address]   Office: [phone]  Fax: [phone]     Cardiac Electrophysiology Consultation    Patient: Ion Guallpa Jr.   : 1945     Referring Provider: Ion Loftno MD  Primary Care Provider: Ion Lofton MD    CHIEF COMPLAINT/REASON FOR CONSULTATION  Sinus node dysfunction    Assessment/Recommendations   Ion Guallpa Jr. is a 77 year old male with sinus bradycardia, CKD referred by Dr. Lofton for consultation regarding sinus bradycardia.    Sinus bradycardia - long history of resting bradycardia, largely asymptomatic with very good functional status including robust exertional capacity.  Objective chronotropic response unknown.    Based on the above, pacemaker implantation would not appear to be indicated at present.  We will plan for cardiac event monitoring to assess for critical subclinical bradyarrhythmia    Follow up: pending results above         History of Present Illness   Ion Guallpa Jr. is a 77 year old male with sinus bradycardia, CKD referred by Dr. Lofton for consultation regarding sinus bradycardia.    Mr. Guallpa has noted a long history of resting HR 50-60bpm via self pulse assessments going back many years.  He has been noted to have have resting heart rates 30-40s reported at recent clinic visits.  He has run 14 marathons between 1835-6748.  He continues to be active with swimming nearly 1mi/day and light weightlifting (he recently had a hiatus due to lower back injury) - he denies exertional limitation.  He notes self-check HR around 80bpm after swimming.  He denies chest pain, palpitations, presyncope, syncope.  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       Physical Examination  Review of Systems   VITALS: /58 (BP Location: Right arm, Patient Position: Sitting, Cuff Size: Adult Regular)   Pulse (!) 48   Resp 14   Wt 78.5 kg (173 lb)   BMI 25.55  kg/m    Wt Readings from Last 3 Encounters:   07/18/23 78.6 kg (173 lb 3.2 oz)   09/01/22 79.4 kg (175 lb 1.6 oz)   08/18/21 78.8 kg (173 lb 12.8 oz)     CONSTITUTIONAL: well nourished, comfortable, no distress  EYES:  Conjunctivae pink, sclerae clear.    E/N/T:  Oral mucosa pink  RESPIRATORY:  Respiratory effort is normal  CARDIOVASCULAR:  regular, bradycardic, normal S1 and S2  GASTROINTESTINAL:  Abdomen without masses or tenderness  EXTREMITIES:  No clubbing or cyanosis.    MUSCULOSKELETAL:  Overall grossly normal muscle strength  SKIN:  Overall, skin warm and dry, no lesions.  NEURO/PSYCH:  Oriented x 3 with normal affect.   Constitutional:  No weight loss or loss of appetite    Eyes:  No difficulty with vision, no double vision, no dry eyes  ENT:  No sore throat, difficulty swallowing; changes in hearing or tinnitus  Cardiovascular: As detailed above  Respiratory:  No cough  Musculoskeletal  No joint pain, muscle aches  Neurologic:  No syncope, lightheadedness, fainting spells   Hematologic: No easy bruising, excessive bleeding tendency   Gastrointestinal:  No jaundice, abdominal pain or abdominal bloating  Genitourinary: No changes in urinary habits, no trouble urinating    Psychiatric: No anxiety or depression      Medical History  Surgical History   No past medical history on file. Past Surgical History:   Procedure Laterality Date    EYE SURGERY      cataract    EYE SURGERY      PHACOEMULSIFICATION CLEAR CORNEA WITH STANDARD INTRAOCULAR LENS IMPLANT  4/5/2012    Procedure:PHACOEMULSIFICATION CLEAR CORNEA WITH STANDARD INTRAOCULAR LENS IMPLANT; RIGH TPHACOEMULSIFICATION CLEAR CORNEA WITH STANDARD INTRAOCULAR LENS IMPLANT ; Surgeon:EDITA JUNG; Location: [facility]    Left knee arthroscopic surgery     Family History Social History   Family History   Problem Relation Age of Onset    Cancer Father         lung    Cancer Brother         colon CA with mets to liver        Social History     Tobacco Use    Smoking  status: Never    Smokeless tobacco: Never   Substance Use Topics    Alcohol use: No    Drug use: No         Medications  Allergies     Current Outpatient Medications:     acetaminophen (TYLENOL) 500 MG tablet, Take 500 mg by mouth, Disp: , Rfl:     allopurinol (ZYLOPRIM) 100 MG tablet, Take 200 mg by mouth daily, Disp: , Rfl:     aspirin (ASA) 81 MG EC tablet, Take 81 mg by mouth, Disp: , Rfl:     famotidine (PEPCID) 20 MG tablet, TAKE 1 TABLET(20 MG) BY MOUTH TWICE DAILY, Disp: 180 tablet, Rfl: 3    ferrous sulfate (FEROSUL) 325 (65 Fe) MG tablet, Take 1 tablet (325 mg) by mouth daily (with breakfast), Disp: 90 tablet, Rfl: 3    fluticasone (FLONASE) 50 MCG/ACT nasal spray, Spray 1 spray into both nostrils daily, Disp: 1 g, Rfl: 3    lisinopril-hydrochlorothiazide (ZESTORETIC) 20-25 MG tablet, TAKE 1 TABLET BY MOUTH DAILY, Disp: 90 tablet, Rfl: 3    timolol maleate (TIMOPTIC) 0.5 % ophthalmic solution, INSTILL 1 DROP IN LEFT EYE TWICE DAILY, Disp: , Rfl:     vitamin C (ASCORBIC ACID) 500 MG tablet, Take 500 mg by mouth (Patient not taking: Reported on 7/18/2023), Disp: , Rfl:     vitamin D2 (ERGOCALCIFEROL) 95386 units (1250 mcg) capsule, TAKE 1 CAPSULE BY MOUTH 1 TIME EVERY WEEK FOR 12 WEEKS THEN STOP AND TAKE OTC 4000 UNITS VIT EVERY DAY (Patient not taking: Reported on 7/18/2023), Disp: , Rfl:     Vitamin D3 (CHOLECALCIFEROL) 25 mcg (1000 units) tablet, Take 25 mcg by mouth daily, Disp: , Rfl:    No Known Allergies       Lab Results    Chemistry CBC Cardiac Enzymes/BNP/TSH/INR   Recent Labs   Lab Test 09/01/22  1356      POTASSIUM 4.2   CHLORIDE 107   CO2 23   *   BUN 26.0*   CR 2.56*   GFRESTIMATED 25*   RHIANNON 9.5     Recent Labs   Lab Test 09/01/22  1356 08/18/21  1145 08/13/20  1415   CR 2.56* 3.06* 2.94*          Recent Labs   Lab Test 09/01/22  1356   WBC 6.9   HGB 13.2*   HCT 39.1*   MCV 92   *     Recent Labs   Lab Test 09/01/22  1356 08/18/21  1145 08/13/20  1415   HGB 13.2* 12.4* 13.7*     No results for input(s): TROPONINI in the last 40684 hours.  No results for input(s): BNP, NTBNPI, NTBNP in the last 74338 hours.  No results for input(s): TSH in the last 62312 hours.  No results for input(s): INR in the last 97817 hours.      Data Review    ECGs (tracings independently reviewed)  5/18/2020 - SB 53bpm  6/13/2019 - SB 52bpm (report only)  5/19/2017 - SB 54bpm (report only)       Cc: Ion Jerome MD  8/15/2023  12:54 PM

## 2023-08-22 ENCOUNTER — HOSPITAL ENCOUNTER (OUTPATIENT)
Dept: CARDIOLOGY | Facility: HOSPITAL | Age: 78
Discharge: HOME | End: 2023-08-22
Attending: INTERNAL MEDICINE | Admitting: INTERNAL MEDICINE
Payer: MEDICARE

## 2023-08-22 DIAGNOSIS — R00.1 BRADYCARDIA: ICD-10-CM

## 2023-08-22 PROCEDURE — 93270 REMOTE 30 DAY ECG REV/REPORT: CPT

## 2023-08-23 ENCOUNTER — TELEPHONE (OUTPATIENT)
Dept: CARDIOLOGY | Facility: CLINIC | Age: 78
End: 2023-08-23
Payer: MEDICARE

## 2023-08-23 NOTE — TELEPHONE ENCOUNTER
----- Message from BETH Dodge sent at 8/23/2023  7:39 AM CDT -----  Regarding: MD notify  MD notify saved for review, thanks!

## 2023-08-23 NOTE — TELEPHONE ENCOUNTER
Dr Jerome-  Please see [employer] alert   SB with Pauses, longest 5sec   Event at 1:30am 8/23/23    Per your last OV 8/15:  Sinus bradycardia - long history of resting bradycardia, largely asymptomatic with very good functional status including robust exertional capacity.  Objective chronotropic response unknown.    Based on the above, pacemaker implantation would not appear to be indicated at present.  We will plan for cardiac event monitoring to assess for critical subclinical bradyarrhythmia

## 2023-08-24 NOTE — TELEPHONE ENCOUNTER
Tiny Jerome MD  [facility] Ep Support Pool - [facility]  Caller: Unspecified (Yesterday,  7:54 AM)  Noted - thanks.  Looks like SB with sinus pauses (vs possible signal drop out?) up to 5s, occurring at 0130.  Can continue to monitor.    Thanks,  Ktahe

## 2023-08-30 DIAGNOSIS — K21.00 GASTROESOPHAGEAL REFLUX DISEASE WITH ESOPHAGITIS: ICD-10-CM

## 2023-08-31 RX ORDER — FAMOTIDINE 20 MG/1
TABLET, FILM COATED ORAL
Qty: 180 TABLET | Refills: 3 | Status: SHIPPED | OUTPATIENT
Start: 2023-08-31

## 2023-08-31 NOTE — TELEPHONE ENCOUNTER
"Routing refill request to provider for review/approval because:  A break in medication    Last Written Prescription Date:  10/28/21  Last Fill Quantity: 180,  # refills: 3   Last office visit provider:   7/18/23    Requested Prescriptions   Pending Prescriptions Disp Refills    famotidine (PEPCID) 20 MG tablet [Pharmacy Med Name: FAMOTIDINE 20MG TABLETS] 180 tablet 3     Sig: TAKE 1 TABLET(20 MG) BY MOUTH TWICE DAILY       H2 Blockers Protocol Passed - 8/30/2023  3:13 PM        Passed - Patient is age 12 or older        Passed - Recent (12 mo) or future (30 days) visit within the authorizing provider's specialty     Patient has had an office visit with the authorizing provider or a provider within the authorizing providers department within the previous 12 mos or has a future within next 30 days. See \"Patient Info\" tab in inbasket, or \"Choose Columns\" in Meds & Orders section of the refill encounter.              Passed - Medication is active on med list             Keli Oseguera RN 08/30/23 9:42 PM  "

## 2023-09-05 ENCOUNTER — TELEPHONE (OUTPATIENT)
Dept: FAMILY MEDICINE | Facility: CLINIC | Age: 78
End: 2023-09-05
Payer: MEDICARE

## 2023-09-05 NOTE — TELEPHONE ENCOUNTER
He is scheduled to see DR Lofton this Thursday at 12:20 for an Annual Wellness Visit.     Unfortunately Dr Lofton has to leave a little earlier that day.    Can we see if we can perhaps reschedule Ion to a different day?    It would be a 40 minute annual wellness visit, fasting if possible would be ideal    Thank you

## 2023-09-06 PROCEDURE — 93228 REMOTE 30 DAY ECG REV/REPORT: CPT | Performed by: INTERNAL MEDICINE

## 2023-09-07 ENCOUNTER — TELEPHONE (OUTPATIENT)
Dept: FAMILY MEDICINE | Facility: CLINIC | Age: 78
End: 2023-09-07

## 2023-09-07 NOTE — TELEPHONE ENCOUNTER
Reason for call:  Other     Patient called regarding (reason for call): prescription    Additional comments: Patient called and stated that his medication Famotidine is not covered by his insurance any more. Writer called pharmacy and they stated he needs a prior Auth or get something different for patient to take. Please advise and call patient or pharmacist if needed please and thank you.    Phone number to reach patient:  Cell number on file:    Telephone Information:   Mobile [phone]       Best Time:  any    Can we leave a detailed message on this number?  YES

## 2023-09-12 ENCOUNTER — TELEPHONE (OUTPATIENT)
Dept: CARDIOLOGY | Facility: CLINIC | Age: 78
End: 2023-09-12
Payer: MEDICARE

## 2023-09-12 NOTE — TELEPHONE ENCOUNTER
[facility] Call Center    Phone Message    May a detailed message be left on voicemail: yes     Reason for Call: Requesting Results     Name/type of test: heart monitor  Date of test: 8/22  Was test done at a location other than [facility] (Please fill in the location if not [facility])?: No    Action Taken: Other: cardio    Travel Screening: Not Applicable

## 2023-09-12 NOTE — TELEPHONE ENCOUNTER
VM left with pt requesting call back to discuss cardiac monitor results. Please see results tab for details.    Didi Hernandez RN

## 2023-09-18 ENCOUNTER — TELEPHONE (OUTPATIENT)
Dept: FAMILY MEDICINE | Facility: CLINIC | Age: 78
End: 2023-09-18
Payer: MEDICARE

## 2023-09-18 NOTE — TELEPHONE ENCOUNTER
PA Initiation    Medication:  famotidine (PEPCID) 20 MG tablet   Insurance Company:  [employer]  Pharmacy Filling the Rx:  [facility] [place], [place]  Filling Pharmacy Phone:  [phone]  Filling Pharmacy Fax:    Start Date:   09/17/2023

## 2023-09-20 NOTE — TELEPHONE ENCOUNTER
Central Prior Authorization Team   Phone: [phone]    PA Initiation    Medication: famotidine (PEPCID) 20 MG tablet  Insurance Company: [employer] - Phone [phone] Fax [phone]  Pharmacy Filling the Rx: [id number] - [place], [place] - [address] AT [facility] [address]  Filling Pharmacy Phone: [phone]  Filling Pharmacy Fax:    Start Date: 9/20/2023

## 2023-09-22 NOTE — TELEPHONE ENCOUNTER
PRIOR AUTHORIZATION DENIED    Medication: famotidine (PEPCID) 20 MG tablet    Denial Date: 9/22/2023    Denial Rational: OTC medications are excluded from coverage

## 2023-10-03 ENCOUNTER — OFFICE VISIT (OUTPATIENT)
Dept: FAMILY MEDICINE | Facility: CLINIC | Age: 78
End: 2023-10-03
Payer: MEDICARE

## 2023-10-03 VITALS
WEIGHT: 165.2 LBS | HEIGHT: 69 IN | OXYGEN SATURATION: 97 % | RESPIRATION RATE: 15 BRPM | HEART RATE: 36 BPM | TEMPERATURE: 97.7 F | BODY MASS INDEX: 24.47 KG/M2

## 2023-10-03 DIAGNOSIS — M10.9 GOUT, UNSPECIFIED CAUSE, UNSPECIFIED CHRONICITY, UNSPECIFIED SITE: ICD-10-CM

## 2023-10-03 DIAGNOSIS — D64.9 NORMOCHROMIC NORMOCYTIC ANEMIA: ICD-10-CM

## 2023-10-03 DIAGNOSIS — Z00.00 ENCOUNTER FOR ANNUAL WELLNESS EXAM IN MEDICARE PATIENT: Primary | ICD-10-CM

## 2023-10-03 DIAGNOSIS — I10 BENIGN ESSENTIAL HYPERTENSION: ICD-10-CM

## 2023-10-03 LAB
ALBUMIN UR-MCNC: NEGATIVE MG/DL
APPEARANCE UR: CLEAR
BILIRUB UR QL STRIP: NEGATIVE
COLOR UR AUTO: YELLOW
CREAT UR-MCNC: 174 MG/DL
ERYTHROCYTE [DISTWIDTH] IN BLOOD BY AUTOMATED COUNT: 13.6 % (ref 10–15)
GLUCOSE UR STRIP-MCNC: NEGATIVE MG/DL
HCT VFR BLD AUTO: 39.3 % (ref 40–53)
HGB BLD-MCNC: 13.3 G/DL (ref 13.3–17.7)
HGB UR QL STRIP: NEGATIVE
KETONES UR STRIP-MCNC: NEGATIVE MG/DL
LEUKOCYTE ESTERASE UR QL STRIP: NEGATIVE
MCH RBC QN AUTO: 31.8 PG (ref 26.5–33)
MCHC RBC AUTO-ENTMCNC: 33.8 G/DL (ref 31.5–36.5)
MCV RBC AUTO: 94 FL (ref 78–100)
MICROALBUMIN UR-MCNC: 18.8 MG/L
MICROALBUMIN/CREAT UR: 10.8 MG/G CR (ref 0–17)
NITRATE UR QL: NEGATIVE
PH UR STRIP: 5.5 [PH] (ref 5–8)
PLATELET # BLD AUTO: 143 10E3/UL (ref 150–450)
RBC # BLD AUTO: 4.18 10E6/UL (ref 4.4–5.9)
SP GR UR STRIP: 1.02 (ref 1–1.03)
UROBILINOGEN UR STRIP-ACNC: 0.2 E.U./DL
WBC # BLD AUTO: 6.3 10E3/UL (ref 4–11)

## 2023-10-03 PROCEDURE — 82043 UR ALBUMIN QUANTITATIVE: CPT | Performed by: FAMILY MEDICINE

## 2023-10-03 PROCEDURE — 82570 ASSAY OF URINE CREATININE: CPT | Performed by: FAMILY MEDICINE

## 2023-10-03 PROCEDURE — 80061 LIPID PANEL: CPT | Performed by: FAMILY MEDICINE

## 2023-10-03 PROCEDURE — 99397 PER PM REEVAL EST PAT 65+ YR: CPT | Performed by: FAMILY MEDICINE

## 2023-10-03 PROCEDURE — 36415 COLL VENOUS BLD VENIPUNCTURE: CPT | Performed by: FAMILY MEDICINE

## 2023-10-03 PROCEDURE — 80053 COMPREHEN METABOLIC PANEL: CPT | Performed by: FAMILY MEDICINE

## 2023-10-03 PROCEDURE — 99213 OFFICE O/P EST LOW 20 MIN: CPT | Mod: 25 | Performed by: FAMILY MEDICINE

## 2023-10-03 PROCEDURE — G0103 PSA SCREENING: HCPCS | Performed by: FAMILY MEDICINE

## 2023-10-03 PROCEDURE — 81003 URINALYSIS AUTO W/O SCOPE: CPT | Performed by: FAMILY MEDICINE

## 2023-10-03 PROCEDURE — 85027 COMPLETE CBC AUTOMATED: CPT | Performed by: FAMILY MEDICINE

## 2023-10-03 ASSESSMENT — ENCOUNTER SYMPTOMS
ABDOMINAL PAIN: 0
MYALGIAS: 0
FREQUENCY: 0
DIZZINESS: 0
CHILLS: 0
FEVER: 0
PALPITATIONS: 0
SHORTNESS OF BREATH: 0
DYSURIA: 0
PARESTHESIAS: 0
DIARRHEA: 0
NERVOUS/ANXIOUS: 0
WEAKNESS: 0
CONSTIPATION: 0
HEMATOCHEZIA: 0
ARTHRALGIAS: 0
HEMATURIA: 0
EYE PAIN: 0
SORE THROAT: 0
HEADACHES: 0
JOINT SWELLING: 0
COUGH: 0
NAUSEA: 0
HEARTBURN: 1

## 2023-10-03 ASSESSMENT — PAIN SCALES - GENERAL: PAINLEVEL: NO PAIN (0)

## 2023-10-03 ASSESSMENT — ACTIVITIES OF DAILY LIVING (ADL): CURRENT_FUNCTION: NO ASSISTANCE NEEDED

## 2023-10-03 NOTE — PROGRESS NOTES
"SUBJECTIVE:   Ion is a 78 year old who presents for Preventive Visit.    Chief complaint I am here for my physical I need my blood pressure pills refilled and my medicines renewed  Are you in the first 12 months of your Medicare coverage?  No  History of present illness and assessment and plan: Macario has been under my care for decades.  He currently is doing quite well.  He does have benign essential hypertension he is on lisinopril hydrochlorothiazide with good control.  He has had a puzzling low pulse chronically which is now in the high 30s.  He has been wearing a monitor intermittently during exercise.  He is being evaluated by cardiology to see if he will need medication.  He is asymptomatic.  He is able to swim at night.  He is still active working.  All medical questions asked were answered I personally reviewed family social history surgeries allergies problems list 100 plan here is to do appropriate blood work including a PSA as I think he has the genetic background to live into his 90s.  Healthy Habits:     In general, how would you rate your overall health?  Excellent    Frequency of exercise:  6-7 days/week    Duration of exercise:  Greater than 60 minutes    Do you usually eat at least 4 servings of fruit and vegetables a day, include whole grains    & fiber and avoid regularly eating high fat or \"junk\" foods?  Yes    Taking medications regularly:  Yes    Medication side effects:  Not applicable    Ability to successfully perform activities of daily living:  No assistance needed    Home Safety:  No safety concerns identified    Hearing Impairment:  No hearing concerns    In the past 6 months, have you been bothered by leaking of urine?  No    In general, how would you rate your overall mental or emotional health?  Excellent    Additional concerns today:  No          Have you ever done Advance Care Planning? (For example, a Health Directive, POLST, or a discussion with a medical provider or your loved " ones about your wishes): Yes, advance care planning is on file.       Fall risk  Fallen 2 or more times in the past year?: No  Any fall with injury in the past year?: No    Cognitive Screening   1) Repeat 3 items (Leader, Season, Table)    2) Clock draw: NORMAL  3) 3 item recall: Recalls 3 objects  Results: NORMAL clock, 1-2 items recalled: COGNITIVE IMPAIRMENT LESS LIKELY    Mini-CogTM Copyright ODESSA Zhao. Licensed by the author for use in [facility]; reprinted with permission (clinton@[email or web address]). All rights reserved.      Do you have sleep apnea, excessive snoring or daytime drowsiness? : no    Reviewed and updated as needed this visit by clinical staff   Tobacco  Allergies  Meds              Reviewed and updated as needed this visit by Provider                 Social History     Tobacco Use    Smoking status: Never    Smokeless tobacco: Never   Substance Use Topics    Alcohol use: No             10/3/2023    11:29 AM   Alcohol Use   Prescreen: >3 drinks/day or >7 drinks/week? Not Applicable     Do you have a current opioid prescription? No  Do you use any other controlled substances or medications that are not prescribed by a provider? None              Current providers sharing in care for this patient include:   Patient Care Team:  Ion Lofton MD as PCP - General  Ion Lofton MD as Assigned PCP  Javier Araujo MD as MD (Dermatology)  Javier Araujo MD as Assigned Surgical Provider  Tiny Jerome MD as MD (Cardiovascular Disease)  Tiny Jerome MD as Assigned Heart and Vascular Provider    The following health maintenance items are reviewed in Epic and correct as of today:  Health Maintenance   Topic Date Due    MICROALBUMIN  Never done    Pneumococcal Vaccine: 65+ Years (1 - PCV) Never done    DTAP/TDAP/TD IMMUNIZATION (1 - Tdap) Never done    ZOSTER IMMUNIZATION (2 of 3) 08/06/2012    LIPID  06/13/2020    ANNUAL REVIEW OF HM ORDERS  08/18/2022     "MEDICARE ANNUAL WELLNESS VISIT  09/01/2023    BMP  09/01/2023    INFLUENZA VACCINE (1) 09/01/2023    COVID-19 Vaccine (3 - 2023-24 season) 09/01/2023    HEMOGLOBIN  09/01/2023    FALL RISK ASSESSMENT  10/03/2024    ADVANCE CARE PLANNING  08/19/2026    PHQ-2 (once per calendar year)  Completed    URINALYSIS  Completed    IPV IMMUNIZATION  Aged Out    HPV IMMUNIZATION  Aged Out    MENINGITIS IMMUNIZATION  Aged Out    COLORECTAL CANCER SCREENING  Discontinued     Lab work is in process          Review of Systems   Constitutional:  Negative for chills and fever.   HENT:  Positive for congestion. Negative for ear pain, hearing loss and sore throat.    Eyes:  Negative for pain and visual disturbance.   Respiratory:  Negative for cough and shortness of breath.    Cardiovascular:  Negative for chest pain, palpitations and peripheral edema.   Gastrointestinal:  Positive for heartburn. Negative for abdominal pain, constipation, diarrhea, hematochezia and nausea.   Genitourinary:  Negative for dysuria, frequency, genital sores, hematuria, impotence, penile discharge and urgency.   Musculoskeletal:  Negative for arthralgias, joint swelling and myalgias.   Skin:  Negative for rash.   Neurological:  Negative for dizziness, weakness, headaches and paresthesias.   Psychiatric/Behavioral:  Negative for mood changes. The patient is not nervous/anxious.          OBJECTIVE:   Pulse (!) 36   Temp 97.7  F (36.5  C) (Temporal)   Resp 15   Ht 1.74 m (5' 8.5\")   Wt 74.9 kg (165 lb 3.2 oz)   SpO2 97%   BMI 24.75 kg/m   Estimated body mass index is 24.75 kg/m  as calculated from the following:    Height as of this encounter: 1.74 m (5' 8.5\").    Weight as of this encounter: 74.9 kg (165 lb 3.2 oz).  Physical Exam  GENERAL: healthy, alert and no distress  EYES: Eyes grossly normal to inspection, PERRL and conjunctivae and sclerae normal  HENT: ear canals and TM's normal, nose and mouth without ulcers or lesions  NECK: no adenopathy, no " asymmetry, masses, or scars and thyroid normal to palpation  RESP: lungs clear to auscultation - no rales, rhonchi or wheezes  CV: regular rate and rhythm, normal S1 S2, no S3 or S4, no murmur, click or rub, no peripheral edema and peripheral pulses strong  ABDOMEN: soft, nontender, no hepatosplenomegaly, no masses and bowel sounds normal  MS: no gross musculoskeletal defects noted, no edema  SKIN: no suspicious lesions or rashes  NEURO: Normal strength and tone, mentation intact and speech normal  PSYCH: mentation appears normal, affect normal/bright        ASSESSMENT / PLAN:       ICD-10-CM    1. Encounter for annual wellness exam in Medicare patient  Z00.00 Albumin Random Urine Quantitative with Creat Ratio     Lipid panel reflex to direct LDL Non-fasting     BASIC METABOLIC PANEL     CBC with platelets     Comprehensive metabolic panel     Prostate Specific Antigen Screen     UA Macroscopic with reflex to Microscopic and Culture     UA Macroscopic with reflex to Microscopic and Culture     Lipid panel reflex to direct LDL Non-fasting     CBC with platelets     Comprehensive metabolic panel     Prostate Specific Antigen Screen     Albumin Random Urine Quantitative with Creat Ratio     CANCELED: BASIC METABOLIC PANEL      2. Normochromic normocytic anemia  D64.9 Albumin Random Urine Quantitative with Creat Ratio     BASIC METABOLIC PANEL     Albumin Random Urine Quantitative with Creat Ratio     CANCELED: BASIC METABOLIC PANEL      3. Benign essential hypertension  I10 Albumin Random Urine Quantitative with Creat Ratio     BASIC METABOLIC PANEL     Albumin Random Urine Quantitative with Creat Ratio     CANCELED: BASIC METABOLIC PANEL      4. Gout, unspecified cause, unspecified chronicity, unspecified site  M10.9 Albumin Random Urine Quantitative with Creat Ratio     BASIC METABOLIC PANEL     Albumin Random Urine Quantitative with Creat Ratio     CANCELED: BASIC METABOLIC PANEL          Patient has been advised of  split billing requirements and indicates understanding: Yes      COUNSELING:  We counseled the patient about the importance of continuing his physical activity        He reports that he has never smoked. He has never used smokeless tobacco.      Appropriate preventive services were discussed with this patient, including applicable screening as appropriate for fall prevention, nutrition, physical activity, Tobacco-use cessation, weight loss and cognition.  Checklist reviewing preventive services available has been given to the patient.    Reviewed patients plan of care and provided an AVS. The Basic Care Plan (routine screening as documented in Health Maintenance) for Ion meets the Care Plan requirement. This Care Plan has been established and reviewed with the Patient.          Ion Lofton MD  [facility]    Identified Health Risks:

## 2023-10-04 LAB
ALBUMIN SERPL BCG-MCNC: 4.2 G/DL (ref 3.5–5.2)
ALP SERPL-CCNC: 78 U/L (ref 40–129)
ALT SERPL W P-5'-P-CCNC: <5 U/L (ref 0–70)
ANION GAP SERPL CALCULATED.3IONS-SCNC: 12 MMOL/L (ref 7–15)
AST SERPL W P-5'-P-CCNC: 15 U/L (ref 0–45)
BILIRUB SERPL-MCNC: 0.7 MG/DL
BUN SERPL-MCNC: 34 MG/DL (ref 8–23)
CALCIUM SERPL-MCNC: 9.4 MG/DL (ref 8.8–10.2)
CHLORIDE SERPL-SCNC: 107 MMOL/L (ref 98–107)
CHOLEST SERPL-MCNC: 123 MG/DL
CREAT SERPL-MCNC: 2.91 MG/DL (ref 0.67–1.17)
DEPRECATED HCO3 PLAS-SCNC: 23 MMOL/L (ref 22–29)
EGFRCR SERPLBLD CKD-EPI 2021: 21 ML/MIN/1.73M2
GLUCOSE SERPL-MCNC: 106 MG/DL (ref 70–99)
HDLC SERPL-MCNC: 38 MG/DL
LDLC SERPL CALC-MCNC: 71 MG/DL
NONHDLC SERPL-MCNC: 85 MG/DL
POTASSIUM SERPL-SCNC: 4.1 MMOL/L (ref 3.4–5.3)
PROT SERPL-MCNC: 6.9 G/DL (ref 6.4–8.3)
PSA SERPL DL<=0.01 NG/ML-MCNC: 2.72 NG/ML
SODIUM SERPL-SCNC: 142 MMOL/L (ref 135–145)
TRIGL SERPL-MCNC: 69 MG/DL

## 2023-10-10 ENCOUNTER — OFFICE VISIT (OUTPATIENT)
Dept: CARDIOLOGY | Facility: CLINIC | Age: 78
End: 2023-10-10
Payer: MEDICARE

## 2023-10-10 VITALS
SYSTOLIC BLOOD PRESSURE: 126 MMHG | DIASTOLIC BLOOD PRESSURE: 42 MMHG | BODY MASS INDEX: 25.12 KG/M2 | OXYGEN SATURATION: 98 % | HEART RATE: 54 BPM | WEIGHT: 169.6 LBS | RESPIRATION RATE: 16 BRPM | HEIGHT: 69 IN

## 2023-10-10 DIAGNOSIS — R00.1 SINUS BRADYCARDIA: Primary | ICD-10-CM

## 2023-10-10 PROCEDURE — 99213 OFFICE O/P EST LOW 20 MIN: CPT | Performed by: INTERNAL MEDICINE

## 2023-10-10 NOTE — PATIENT INSTRUCTIONS
[facility]  Cardiac Electrophysiology  [address]  [place] [address]   Office: [phone]  Fax: [phone]       Thank you for seeing us in clinic today - it is a pleasure to be a part of your care team.  Below is a summary of our plan from today's visit.      You have had a long history of low resting heart rates, though this has not correlated to any symptoms.  You have continued to be very active including swimming 6 days per week without functional limitation.    We will plan for the following:  - continued monitoring for symptoms related to low heart rates (fatigue, exertional intolerance, lightheadedness, dizziness, loss of consciousness) and contact us should these occur, in which case we will likely proceed with pacemaker implantation    Please do not hesitate to be in touch with our office at [phone] with any questions that may arise.      Thank you for trusting us with your care,    Tiny Jerome MD  Clinical Cardiac Electrophysiology  [facility]  [address]  [place] [address]   Office: [phone]  Fax: [phone]

## 2023-10-10 NOTE — PROGRESS NOTES
[facility]  Cardiac Electrophysiology  [address]  [place] [address]   Office: [phone]  Fax: [phone]     Cardiac Electrophysiology Follow-up Visit    Patient: Ion Guallpa Jr.   : 1945     Referring Provider: Ion Lofton MD  Primary Care Provider: Ion Lofton MD    CHIEF COMPLAINT/REASON FOR VISIT  Sinus node dysfunction    Assessment/Recommendations   Ion Guallpa Jr. is a 78 year old male with sinus bradycardia, CKD presenting for follow-up regarding sinus bradycardia.    Sinus bradycardia - long history of resting bradycardia, largely asymptomatic with very good functional status including robust exertional capacity.    MCT 2023 with sinus bradycardia, HR 37-88bpm, avg 40bpm., nocturnal post PAC and post-PVC sinus pauses up to 5s, intermittent junctional beats.  We will plan for the following:  - continued monitoring for symptoms related to low heart rates (fatigue, exertional intolerance, lightheadedness, loss of consciousness) and contact us should these occur, in which case we will likely proceed with pacemaker implantation    Follow up: he will monitor for symptoms as above, and will contact us should these develop         History of Present Illness   Ion Guallpa Jr. is a 78 year old male with sinus bradycardia, CKD presenting for follow-up regarding sinus bradycardia.    Mr. Guallpa has noted a long history of resting HR 50-60bpm via self pulse assessments going back many years, including HR 60-70bpm after going on long distance runs (including marathons).  He has been noted to have have resting heart rates 30-40s reported at recent clinic visits.  He has run 14 marathons between 6667-0835.  He continues to be active with swimming nearly 1mi/day and light weightlifting 5-6 times per week - he denies exertional limitation.  He notes self-check HR around 80bpm after swimming.  MCT 2023 with sinus bradycardia, nocturnal post PAC and  "post-PVC sinus pauses up to 5s, intermittent junctional beats - he notes some difficulty wearing the monitor including skin irritation.      He denies chest pain, palpitations, presyncope, syncope.  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         Physical Examination  Review of Systems   VITALS: /42 (BP Location: Left arm, Patient Position: Sitting, Cuff Size: Adult Regular)   Pulse 54   Resp 16   Ht 1.74 m (5' 8.5\")   Wt 76.9 kg (169 lb 9.6 oz)   SpO2 98%   BMI 25.41 kg/m    Wt Readings from Last 3 Encounters:   10/03/23 74.9 kg (165 lb 3.2 oz)   08/15/23 78.5 kg (173 lb)   07/18/23 78.6 kg (173 lb 3.2 oz)     CONSTITUTIONAL: well nourished, comfortable, no distress  EYES:  Conjunctivae pink, sclerae clear.    E/N/T:  Oral mucosa pink  RESPIRATORY:  Respiratory effort is normal  CARDIOVASCULAR:  regular, bradycardic, normal S1 and S2  GASTROINTESTINAL:  Abdomen without masses or tenderness  EXTREMITIES:  No clubbing or cyanosis.    MUSCULOSKELETAL:  Overall grossly normal muscle strength  SKIN:  Overall, skin warm and dry, no lesions.  NEURO/PSYCH:  Oriented x 3 with normal affect.   Constitutional:  No weight loss or loss of appetite    Eyes:  No difficulty with vision, no double vision, no dry eyes  ENT:  No sore throat, difficulty swallowing; changes in hearing or tinnitus  Cardiovascular: As detailed above  Respiratory:  No cough  Musculoskeletal  No joint pain, muscle aches  Neurologic:  No syncope, lightheadedness, fainting spells   Hematologic: No easy bruising, excessive bleeding tendency   Gastrointestinal:  No jaundice, abdominal pain or abdominal bloating  Genitourinary: No changes in urinary habits, no trouble urinating    Psychiatric: No anxiety or depression      Medical History  Surgical History   No past medical history on file. Past Surgical History:   Procedure Laterality Date    EYE SURGERY      cataract    EYE SURGERY      PHACOEMULSIFICATION CLEAR CORNEA WITH STANDARD " INTRAOCULAR LENS IMPLANT  4/5/2012    Procedure:PHACOEMULSIFICATION CLEAR CORNEA WITH STANDARD INTRAOCULAR LENS IMPLANT; RIGH TPHACOEMULSIFICATION CLEAR CORNEA WITH STANDARD INTRAOCULAR LENS IMPLANT ; Surgeon:EDITA JUNG; Location:[facility]    Left knee arthroscopic surgery     Family History Social History   Family History   Problem Relation Age of Onset    Cancer Father         lung    Cancer Brother         colon CA with mets to liver        Social History     Tobacco Use    Smoking status: Never    Smokeless tobacco: Never   Substance Use Topics    Alcohol use: No    Drug use: No         Medications  Allergies     Current Outpatient Medications:     acetaminophen (TYLENOL) 500 MG tablet, Take 500 mg by mouth every 8 hours as needed, Disp: , Rfl:     allopurinol (ZYLOPRIM) 100 MG tablet, Take 200 mg by mouth daily, Disp: , Rfl:     aspirin (ASA) 81 MG EC tablet, Take 81 mg by mouth daily, Disp: , Rfl:     famotidine (PEPCID) 20 MG tablet, TAKE 1 TABLET(20 MG) BY MOUTH TWICE DAILY, Disp: 180 tablet, Rfl: 3    ferrous sulfate (FEROSUL) 325 (65 Fe) MG tablet, Take 1 tablet (325 mg) by mouth daily (with breakfast), Disp: 90 tablet, Rfl: 3    fluticasone (FLONASE) 50 MCG/ACT nasal spray, Spray 1 spray into both nostrils daily, Disp: 1 g, Rfl: 3    Glycerin-Hypromellose- (DRY EYE RELIEF DROPS OP), Place 1 drop into both eyes daily as needed, Disp: , Rfl:     lisinopril-hydrochlorothiazide (ZESTORETIC) 20-25 MG tablet, TAKE 1 TABLET BY MOUTH DAILY, Disp: 90 tablet, Rfl: 3    vitamin C (ASCORBIC ACID) 500 MG tablet, Take 500 mg by mouth daily, Disp: , Rfl:     vitamin D3 (CHOLECALCIFEROL) 50 mcg (2000 units) tablet, Take 2 tablets by mouth daily, Disp: , Rfl:    No Known Allergies       Lab Results    Chemistry CBC Cardiac Enzymes/BNP/TSH/INR   Recent Labs   Lab Test 09/01/22  1356      POTASSIUM 4.2   CHLORIDE 107   CO2 23   *   BUN 26.0*   CR 2.56*   GFRESTIMATED 25*   RHIANNON 9.5     Recent Labs   Lab  Test 09/01/22  1356 08/18/21  1145 08/13/20  1415   CR 2.56* 3.06* 2.94*          Recent Labs   Lab Test 09/01/22  1356   WBC 6.9   HGB 13.2*   HCT 39.1*   MCV 92   *     Recent Labs   Lab Test 09/01/22  1356 08/18/21  1145 08/13/20  1415   HGB 13.2* 12.4* 13.7*    No results for input(s): TROPONINI in the last 68186 hours.  No results for input(s): BNP, NTBNPI, NTBNP in the last 72099 hours.  No results for input(s): TSH in the last 21209 hours.  No results for input(s): INR in the last 69247 hours.      Data Review    ECGs (tracings independently reviewed)  5/18/2020 - SB 53bpm  6/13/2019 - SB 52bpm (report only)  5/19/2017 - SB 54bpm (report only)    8/22/2023 to 9/4/2023 MCT (only 17h 16m data, independently reviewed)  Abnormal multi day patient activated monitor by virtue of the finding of bradycardia.  Pauses up to 5 seconds in duration were observed but these occurred during sleep and there was no indication that the patient was symptomatic.  Limited data set results and diminished sensitivity and specificity regarding the patient's rhythm and potential symptom correlation.  No sustained atrial or ventricular tachyarrhythmia.  Notes: predominantly SR, HR range 37-88bpm, avg 40bpm.  Noted pauses are post PAC or post PVC       Cc: Ion Jerome MD  10/10/2023  1:12 PM

## 2023-10-10 NOTE — LETTER
10/10/2023    oIn Lofton MD  [address]  [facility] [address]    RE: Ion Guallpa Jr.       Dear Colleague,     I had the pleasure of seeing Ion Guallpa Jr. in the [facility].     [facility]  Cardiac Electrophysiology  [address]  [place] [address]   Office: [phone]  Fax: [phone]     Cardiac Electrophysiology Follow-up Visit    Patient: Ion Guallpa Jr.   : 1945     Referring Provider: Ion Lofton MD  Primary Care Provider: Ion Lofton MD    CHIEF COMPLAINT/REASON FOR VISIT  Sinus node dysfunction    Assessment/Recommendations   Ion Guallpa Jr. is a 78 year old male with sinus bradycardia, CKD presenting for follow-up regarding sinus bradycardia.    Sinus bradycardia - long history of resting bradycardia, largely asymptomatic with very good functional status including robust exertional capacity.    MCT 2023 with sinus bradycardia, HR 37-88bpm, avg 40bpm., nocturnal post PAC and post-PVC sinus pauses up to 5s, intermittent junctional beats.  We will plan for the following:  - continued monitoring for symptoms related to low heart rates (fatigue, exertional intolerance, lightheadedness, loss of consciousness) and contact us should these occur, in which case we will likely proceed with pacemaker implantation    Follow up: he will monitor for symptoms as above, and will contact us should these develop         History of Present Illness   Ion Guallpa Jr. is a 78 year old male with sinus bradycardia, CKD presenting for follow-up regarding sinus bradycardia.    Mr. Guallpa has noted a long history of resting HR 50-60bpm via self pulse assessments going back many years, including HR 60-70bpm after going on long distance runs (including marathons).  He has been noted to have have resting heart rates 30-40s reported at recent clinic visits.  He has run 14 marathons between 5297-5829.  He continues to be active  "with swimming nearly 1mi/day and light weightlifting 5-6 times per week - he denies exertional limitation.  He notes self-check HR around 80bpm after swimming.  MCT 8/2023 with sinus bradycardia, nocturnal post PAC and post-PVC sinus pauses up to 5s, intermittent junctional beats - he notes some difficulty wearing the monitor including skin irritation.      He denies chest pain, palpitations, presyncope, syncope.  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         Physical Examination  Review of Systems   VITALS: /42 (BP Location: Left arm, Patient Position: Sitting, Cuff Size: Adult Regular)   Pulse 54   Resp 16   Ht 1.74 m (5' 8.5\")   Wt 76.9 kg (169 lb 9.6 oz)   SpO2 98%   BMI 25.41 kg/m    Wt Readings from Last 3 Encounters:   10/03/23 74.9 kg (165 lb 3.2 oz)   08/15/23 78.5 kg (173 lb)   07/18/23 78.6 kg (173 lb 3.2 oz)     CONSTITUTIONAL: well nourished, comfortable, no distress  EYES:  Conjunctivae pink, sclerae clear.    E/N/T:  Oral mucosa pink  RESPIRATORY:  Respiratory effort is normal  CARDIOVASCULAR:  regular, bradycardic, normal S1 and S2  GASTROINTESTINAL:  Abdomen without masses or tenderness  EXTREMITIES:  No clubbing or cyanosis.    MUSCULOSKELETAL:  Overall grossly normal muscle strength  SKIN:  Overall, skin warm and dry, no lesions.  NEURO/PSYCH:  Oriented x 3 with normal affect.   Constitutional:  No weight loss or loss of appetite    Eyes:  No difficulty with vision, no double vision, no dry eyes  ENT:  No sore throat, difficulty swallowing; changes in hearing or tinnitus  Cardiovascular: As detailed above  Respiratory:  No cough  Musculoskeletal  No joint pain, muscle aches  Neurologic:  No syncope, lightheadedness, fainting spells   Hematologic: No easy bruising, excessive bleeding tendency   Gastrointestinal:  No jaundice, abdominal pain or abdominal bloating  Genitourinary: No changes in urinary habits, no trouble urinating    Psychiatric: No anxiety or depression  "     Medical History  Surgical History   No past medical history on file. Past Surgical History:   Procedure Laterality Date    EYE SURGERY      cataract    EYE SURGERY      PHACOEMULSIFICATION CLEAR CORNEA WITH STANDARD INTRAOCULAR LENS IMPLANT  4/5/2012    Procedure:PHACOEMULSIFICATION CLEAR CORNEA WITH STANDARD INTRAOCULAR LENS IMPLANT; RIGH TPHACOEMULSIFICATION CLEAR CORNEA WITH STANDARD INTRAOCULAR LENS IMPLANT ; Surgeon:EDITA JUNG; Location:[facility]    Left knee arthroscopic surgery     Family History Social History   Family History   Problem Relation Age of Onset    Cancer Father         lung    Cancer Brother         colon CA with mets to liver        Social History     Tobacco Use    Smoking status: Never    Smokeless tobacco: Never   Substance Use Topics    Alcohol use: No    Drug use: No         Medications  Allergies     Current Outpatient Medications:     acetaminophen (TYLENOL) 500 MG tablet, Take 500 mg by mouth every 8 hours as needed, Disp: , Rfl:     allopurinol (ZYLOPRIM) 100 MG tablet, Take 200 mg by mouth daily, Disp: , Rfl:     aspirin (ASA) 81 MG EC tablet, Take 81 mg by mouth daily, Disp: , Rfl:     famotidine (PEPCID) 20 MG tablet, TAKE 1 TABLET(20 MG) BY MOUTH TWICE DAILY, Disp: 180 tablet, Rfl: 3    ferrous sulfate (FEROSUL) 325 (65 Fe) MG tablet, Take 1 tablet (325 mg) by mouth daily (with breakfast), Disp: 90 tablet, Rfl: 3    fluticasone (FLONASE) 50 MCG/ACT nasal spray, Spray 1 spray into both nostrils daily, Disp: 1 g, Rfl: 3    Glycerin-Hypromellose- (DRY EYE RELIEF DROPS OP), Place 1 drop into both eyes daily as needed, Disp: , Rfl:     lisinopril-hydrochlorothiazide (ZESTORETIC) 20-25 MG tablet, TAKE 1 TABLET BY MOUTH DAILY, Disp: 90 tablet, Rfl: 3    vitamin C (ASCORBIC ACID) 500 MG tablet, Take 500 mg by mouth daily, Disp: , Rfl:     vitamin D3 (CHOLECALCIFEROL) 50 mcg (2000 units) tablet, Take 2 tablets by mouth daily, Disp: , Rfl:    No Known Allergies       Lab Results     Chemistry CBC Cardiac Enzymes/BNP/TSH/INR   Recent Labs   Lab Test 09/01/22  1356      POTASSIUM 4.2   CHLORIDE 107   CO2 23   *   BUN 26.0*   CR 2.56*   GFRESTIMATED 25*   RHIANNON 9.5     Recent Labs   Lab Test 09/01/22  1356 08/18/21  1145 08/13/20  1415   CR 2.56* 3.06* 2.94*          Recent Labs   Lab Test 09/01/22  1356   WBC 6.9   HGB 13.2*   HCT 39.1*   MCV 92   *     Recent Labs   Lab Test 09/01/22  1356 08/18/21  1145 08/13/20  1415   HGB 13.2* 12.4* 13.7*    No results for input(s): TROPONINI in the last 72993 hours.  No results for input(s): BNP, NTBNPI, NTBNP in the last 68548 hours.  No results for input(s): TSH in the last 29929 hours.  No results for input(s): INR in the last 01930 hours.      Data Review    ECGs (tracings independently reviewed)  5/18/2020 - SB 53bpm  6/13/2019 - SB 52bpm (report only)  5/19/2017 - SB 54bpm (report only)    8/22/2023 to 9/4/2023 MCT (only 17h 16m data, independently reviewed)  Abnormal multi day patient activated monitor by virtue of the finding of bradycardia.  Pauses up to 5 seconds in duration were observed but these occurred during sleep and there was no indication that the patient was symptomatic.  Limited data set results and diminished sensitivity and specificity regarding the patient's rhythm and potential symptom correlation.  No sustained atrial or ventricular tachyarrhythmia.  Notes: predominantly SR, HR range 37-88bpm, avg 40bpm.  Noted pauses are post PAC or post PVC       Cc: Ion Jerome MD  10/10/2023  1:12 PM        Thank you for allowing me to participate in the care of your patient.      Sincerely,     Tiny Jerome MD     [facility]  cc:   Tiny Jerome MD  [address]  [place] [address]

## 2024-04-11 ENCOUNTER — TRANSFERRED RECORDS (OUTPATIENT)
Dept: HEALTH INFORMATION MANAGEMENT | Facility: CLINIC | Age: 79
End: 2024-04-11
Payer: MEDICARE

## 2024-05-15 ENCOUNTER — TRANSFERRED RECORDS (OUTPATIENT)
Dept: HEALTH INFORMATION MANAGEMENT | Facility: CLINIC | Age: 79
End: 2024-05-15
Payer: MEDICARE

## 2024-06-17 ENCOUNTER — TRANSFERRED RECORDS (OUTPATIENT)
Dept: HEALTH INFORMATION MANAGEMENT | Facility: CLINIC | Age: 79
End: 2024-06-17
Payer: MEDICARE

## 2024-08-02 ENCOUNTER — TRANSFERRED RECORDS (OUTPATIENT)
Dept: HEALTH INFORMATION MANAGEMENT | Facility: CLINIC | Age: 79
End: 2024-08-02
Payer: MEDICARE

## 2024-10-08 ENCOUNTER — OFFICE VISIT (OUTPATIENT)
Dept: FAMILY MEDICINE | Facility: CLINIC | Age: 79
End: 2024-10-08
Payer: MEDICARE

## 2024-10-08 VITALS
BODY MASS INDEX: 25.11 KG/M2 | HEART RATE: 43 BPM | DIASTOLIC BLOOD PRESSURE: 54 MMHG | OXYGEN SATURATION: 97 % | SYSTOLIC BLOOD PRESSURE: 135 MMHG | WEIGHT: 165.7 LBS | RESPIRATION RATE: 15 BRPM | HEIGHT: 68 IN | TEMPERATURE: 98.8 F

## 2024-10-08 DIAGNOSIS — Z29.11 NEED FOR VACCINATION AGAINST RESPIRATORY SYNCYTIAL VIRUS: ICD-10-CM

## 2024-10-08 DIAGNOSIS — Z00.01 ABNORMAL WELLNESS EXAM: Primary | ICD-10-CM

## 2024-10-08 DIAGNOSIS — I10 BENIGN ESSENTIAL HYPERTENSION: ICD-10-CM

## 2024-10-08 DIAGNOSIS — Z12.5 ENCOUNTER FOR SCREENING FOR MALIGNANT NEOPLASM OF PROSTATE: ICD-10-CM

## 2024-10-08 DIAGNOSIS — N18.30 CHRONIC RENAL FAILURE, STAGE 3 (MODERATE), UNSPECIFIED WHETHER STAGE 3A OR 3B CKD (H): ICD-10-CM

## 2024-10-08 DIAGNOSIS — Z23 NEED FOR SHINGLES VACCINE: ICD-10-CM

## 2024-10-08 LAB
ALBUMIN UR-MCNC: NEGATIVE MG/DL
APPEARANCE UR: CLEAR
BASOPHILS # BLD AUTO: 0.1 10E3/UL (ref 0–0.2)
BASOPHILS NFR BLD AUTO: 1 %
BILIRUB UR QL STRIP: NEGATIVE
COLOR UR AUTO: YELLOW
EOSINOPHIL # BLD AUTO: 0.2 10E3/UL (ref 0–0.7)
EOSINOPHIL NFR BLD AUTO: 3 %
ERYTHROCYTE [DISTWIDTH] IN BLOOD BY AUTOMATED COUNT: 13.5 % (ref 10–15)
GLUCOSE UR STRIP-MCNC: NEGATIVE MG/DL
HCT VFR BLD AUTO: 36.6 % (ref 40–53)
HGB BLD-MCNC: 12.6 G/DL (ref 13.3–17.7)
HGB UR QL STRIP: NEGATIVE
IMM GRANULOCYTES # BLD: 0 10E3/UL
IMM GRANULOCYTES NFR BLD: 0 %
KETONES UR STRIP-MCNC: NEGATIVE MG/DL
LEUKOCYTE ESTERASE UR QL STRIP: NEGATIVE
LYMPHOCYTES # BLD AUTO: 1.1 10E3/UL (ref 0.8–5.3)
LYMPHOCYTES NFR BLD AUTO: 17 %
MCH RBC QN AUTO: 31.8 PG (ref 26.5–33)
MCHC RBC AUTO-ENTMCNC: 34.4 G/DL (ref 31.5–36.5)
MCV RBC AUTO: 92 FL (ref 78–100)
MONOCYTES # BLD AUTO: 0.5 10E3/UL (ref 0–1.3)
MONOCYTES NFR BLD AUTO: 7 %
NEUTROPHILS # BLD AUTO: 4.8 10E3/UL (ref 1.6–8.3)
NEUTROPHILS NFR BLD AUTO: 73 %
NITRATE UR QL: NEGATIVE
PH UR STRIP: 5.5 [PH] (ref 5–8)
PLATELET # BLD AUTO: 133 10E3/UL (ref 150–450)
RBC # BLD AUTO: 3.96 10E6/UL (ref 4.4–5.9)
SP GR UR STRIP: 1.02 (ref 1–1.03)
UROBILINOGEN UR STRIP-ACNC: 0.2 E.U./DL
WBC # BLD AUTO: 6.5 10E3/UL (ref 4–11)

## 2024-10-08 PROCEDURE — 80061 LIPID PANEL: CPT | Performed by: FAMILY MEDICINE

## 2024-10-08 PROCEDURE — 36415 COLL VENOUS BLD VENIPUNCTURE: CPT | Performed by: FAMILY MEDICINE

## 2024-10-08 PROCEDURE — G0438 PPPS, INITIAL VISIT: HCPCS | Performed by: FAMILY MEDICINE

## 2024-10-08 PROCEDURE — 81003 URINALYSIS AUTO W/O SCOPE: CPT | Performed by: FAMILY MEDICINE

## 2024-10-08 PROCEDURE — 80048 BASIC METABOLIC PNL TOTAL CA: CPT | Performed by: FAMILY MEDICINE

## 2024-10-08 PROCEDURE — 99213 OFFICE O/P EST LOW 20 MIN: CPT | Mod: 25 | Performed by: FAMILY MEDICINE

## 2024-10-08 PROCEDURE — G0103 PSA SCREENING: HCPCS | Mod: GZ | Performed by: FAMILY MEDICINE

## 2024-10-08 PROCEDURE — 85025 COMPLETE CBC W/AUTO DIFF WBC: CPT | Performed by: FAMILY MEDICINE

## 2024-10-08 RX ORDER — BROMFENAC SODIUM 0.7 MG/ML
SOLUTION/ DROPS OPHTHALMIC
COMMUNITY
Start: 2023-12-27

## 2024-10-08 ASSESSMENT — PAIN SCALES - GENERAL: PAINLEVEL: NO PAIN (0)

## 2024-10-08 NOTE — PROGRESS NOTES
Assessment & Plan     Abnormal wellness exam    - Lipid panel reflex to direct LDL Non-fasting; Future  - CBC with Platelets & Differential; Future    Benign essential hypertension    - BASIC METABOLIC PANEL; Future  - PSA, screen; Future    Chronic renal failure, stage 3 (moderate), unspecified whether stage 3a or 3b CKD (H)    Neuro  Need for shingles vaccine      Need for vaccination against respiratory syncytial virus  Wants to think this over    Encounter for screening for malignant neoplasm of prostate  Recheck PSA  - PSA, screen; Future    Patient has been advised of split billing requirements and indicates understanding: Yes        Counseling  Appropriate preventive services were addressed with this patient via screening, questionnaire, or discussion as appropriate for fall prevention, nutrition, physical activity, Tobacco-use cessation, social engagement, weight loss and cognition.  Checklist reviewing preventive services available has been given to the patient.  Reviewed patient's diet, addressing concerns and/or questions.   Patient reported safety concerns were addressed today.        Anaya Lemon is a 79 year old, presenting for the following health issues:  Physical    HPI Macario presents for his annual wellness examination.  He has been under my care for many years.  Does have benign essential hypertension well-controlled on medication.  He does have bradycardia he has seen cardiology pulse ranges between 40 and 46 he is a daily swimmer gets occasionally lightheaded when he is on a long trip and gets out of a car but other than that no episodes no falls falls risk minimal he is in excellent shape we did discuss vaccines I suggest he get the shingles vaccine first and then update himself with COVID and flu and then begin annual COVID vaccines.  All medical questions asked were answered he is doing quite well he has no nocturia urgency or frequency.  He has seen a nephrologist he does have chronic  "kidney disease stage III well-managed.  I will see him for follow-up 1 year appropriate workup and follow-up if needed              Review of Systems  CONSTITUTIONAL: NEGATIVE for fever, chills, change in weight  INTEGUMENTARY/SKIN: NEGATIVE for worrisome rashes, moles or lesions  EYES: NEGATIVE for vision changes or irritation  ENT/MOUTH: NEGATIVE for ear, mouth and throat problems  RESP: NEGATIVE for significant cough or SOB  BREAST: NEGATIVE for masses, tenderness or discharge  CV: NEGATIVE for chest pain, palpitations or peripheral edema  GI: NEGATIVE for nausea, abdominal pain, heartburn, or change in bowel habits  : NEGATIVE for frequency, dysuria, or hematuria  MUSCULOSKELETAL: NEGATIVE for significant arthralgias or myalgia  NEURO: NEGATIVE for weakness, dizziness or paresthesias  ENDOCRINE: NEGATIVE for temperature intolerance, skin/hair changes  HEME: NEGATIVE for bleeding problems  PSYCHIATRIC: NEGATIVE for changes in mood or affect      Objective    /54 (BP Location: Right arm, Patient Position: Sitting, Cuff Size: Adult Regular)   Pulse (!) 43   Temp 98.8  F (37.1  C) (Temporal)   Resp 15   Ht 1.735 m (5' 8.31\")   Wt 75.2 kg (165 lb 11.2 oz)   SpO2 97%   BMI 24.97 kg/m    Body mass index is 24.97 kg/m .  Physical Exam   GENERAL: alert and no distress  EYES: Eyes grossly normal to inspection, PERRL and conjunctivae and sclerae normal  HENT: ear canals and TM's normal, nose and mouth without ulcers or lesions  NECK: no adenopathy, no asymmetry, masses, or scars  RESP: lungs clear to auscultation - no rales, rhonchi or wheezes  CV: regular rate and rhythm, normal S1 S2, no S3 or S4, no murmur, click or rub, no peripheral edema  ABDOMEN: soft, nontender, no hepatosplenomegaly, no masses and bowel sounds normal  MS: no gross musculoskeletal defects noted, no edema  SKIN: no suspicious lesions or rashes  NEURO: Normal strength and tone, mentation intact and speech normal  PSYCH: mentation " appears normal, affect normal/bright            Signed Electronically by: Ion Lofton MD

## 2024-10-09 LAB
ANION GAP SERPL CALCULATED.3IONS-SCNC: 13 MMOL/L (ref 7–15)
BUN SERPL-MCNC: 32.7 MG/DL (ref 8–23)
CALCIUM SERPL-MCNC: 9.5 MG/DL (ref 8.8–10.4)
CHLORIDE SERPL-SCNC: 108 MMOL/L (ref 98–107)
CHOLEST SERPL-MCNC: 131 MG/DL
CREAT SERPL-MCNC: 2.75 MG/DL (ref 0.67–1.17)
EGFRCR SERPLBLD CKD-EPI 2021: 22 ML/MIN/1.73M2
FASTING STATUS PATIENT QL REPORTED: ABNORMAL
FASTING STATUS PATIENT QL REPORTED: ABNORMAL
GLUCOSE SERPL-MCNC: 98 MG/DL (ref 70–99)
HCO3 SERPL-SCNC: 22 MMOL/L (ref 22–29)
HDLC SERPL-MCNC: 39 MG/DL
LDLC SERPL CALC-MCNC: 75 MG/DL
NONHDLC SERPL-MCNC: 92 MG/DL
POTASSIUM SERPL-SCNC: 4.4 MMOL/L (ref 3.4–5.3)
PSA SERPL DL<=0.01 NG/ML-MCNC: 1.83 NG/ML
SODIUM SERPL-SCNC: 143 MMOL/L (ref 135–145)
TRIGL SERPL-MCNC: 87 MG/DL

## 2025-02-19 ENCOUNTER — TRANSFERRED RECORDS (OUTPATIENT)
Dept: HEALTH INFORMATION MANAGEMENT | Facility: CLINIC | Age: 80
End: 2025-02-19
Payer: MEDICARE

## 2025-04-17 ENCOUNTER — TRANSFERRED RECORDS (OUTPATIENT)
Dept: HEALTH INFORMATION MANAGEMENT | Facility: CLINIC | Age: 80
End: 2025-04-17
Payer: MEDICARE

## 2025-05-07 ENCOUNTER — OFFICE VISIT (OUTPATIENT)
Dept: FAMILY MEDICINE | Facility: CLINIC | Age: 80
End: 2025-05-07
Payer: MEDICARE

## 2025-05-07 VITALS
WEIGHT: 170.6 LBS | OXYGEN SATURATION: 97 % | TEMPERATURE: 98.2 F | RESPIRATION RATE: 14 BRPM | SYSTOLIC BLOOD PRESSURE: 128 MMHG | DIASTOLIC BLOOD PRESSURE: 68 MMHG | BODY MASS INDEX: 25.27 KG/M2 | HEIGHT: 69 IN | HEART RATE: 52 BPM

## 2025-05-07 DIAGNOSIS — Z12.5 ENCOUNTER FOR SCREENING FOR MALIGNANT NEOPLASM OF PROSTATE: ICD-10-CM

## 2025-05-07 DIAGNOSIS — R00.1 SINUS BRADYCARDIA: ICD-10-CM

## 2025-05-07 DIAGNOSIS — Z00.00 WELLNESS EXAMINATION: Primary | ICD-10-CM

## 2025-05-07 LAB
ALBUMIN UR-MCNC: 100 MG/DL
APPEARANCE UR: CLEAR
BACTERIA #/AREA URNS HPF: ABNORMAL /HPF
BILIRUB UR QL STRIP: NEGATIVE
COLOR UR AUTO: YELLOW
ERYTHROCYTE [DISTWIDTH] IN BLOOD BY AUTOMATED COUNT: 13.8 % (ref 10–15)
GLUCOSE UR STRIP-MCNC: NEGATIVE MG/DL
HCT VFR BLD AUTO: 37.3 % (ref 40–53)
HGB BLD-MCNC: 12.8 G/DL (ref 13.3–17.7)
HGB UR QL STRIP: ABNORMAL
HYALINE CASTS #/AREA URNS LPF: ABNORMAL /LPF
KETONES UR STRIP-MCNC: NEGATIVE MG/DL
LEUKOCYTE ESTERASE UR QL STRIP: NEGATIVE
MCH RBC QN AUTO: 31.9 PG (ref 26.5–33)
MCHC RBC AUTO-ENTMCNC: 34.3 G/DL (ref 31.5–36.5)
MCV RBC AUTO: 93 FL (ref 78–100)
MUCOUS THREADS #/AREA URNS LPF: PRESENT /LPF
NITRATE UR QL: NEGATIVE
PH UR STRIP: 5.5 [PH] (ref 5–8)
PLATELET # BLD AUTO: 127 10E3/UL (ref 150–450)
RBC # BLD AUTO: 4.01 10E6/UL (ref 4.4–5.9)
RBC #/AREA URNS AUTO: ABNORMAL /HPF
SP GR UR STRIP: 1.02 (ref 1–1.03)
SQUAMOUS #/AREA URNS AUTO: ABNORMAL /LPF
UROBILINOGEN UR STRIP-ACNC: 0.2 E.U./DL
WBC # BLD AUTO: 7.2 10E3/UL (ref 4–11)
WBC #/AREA URNS AUTO: ABNORMAL /HPF

## 2025-05-07 PROCEDURE — 81001 URINALYSIS AUTO W/SCOPE: CPT | Performed by: FAMILY MEDICINE

## 2025-05-07 PROCEDURE — 99213 OFFICE O/P EST LOW 20 MIN: CPT | Performed by: FAMILY MEDICINE

## 2025-05-07 PROCEDURE — G0103 PSA SCREENING: HCPCS | Performed by: FAMILY MEDICINE

## 2025-05-07 PROCEDURE — 36415 COLL VENOUS BLD VENIPUNCTURE: CPT | Performed by: FAMILY MEDICINE

## 2025-05-07 PROCEDURE — 85027 COMPLETE CBC AUTOMATED: CPT | Performed by: FAMILY MEDICINE

## 2025-05-07 PROCEDURE — 80053 COMPREHEN METABOLIC PANEL: CPT | Performed by: FAMILY MEDICINE

## 2025-05-07 PROCEDURE — 1126F AMNT PAIN NOTED NONE PRSNT: CPT | Performed by: FAMILY MEDICINE

## 2025-05-07 PROCEDURE — G2211 COMPLEX E/M VISIT ADD ON: HCPCS | Performed by: FAMILY MEDICINE

## 2025-05-07 PROCEDURE — 3074F SYST BP LT 130 MM HG: CPT | Performed by: FAMILY MEDICINE

## 2025-05-07 PROCEDURE — 3078F DIAST BP <80 MM HG: CPT | Performed by: FAMILY MEDICINE

## 2025-05-07 ASSESSMENT — PAIN SCALES - GENERAL: PAINLEVEL_OUTOF10: NO PAIN (0)

## 2025-05-07 NOTE — PROGRESS NOTES
"  Assessment & Plan     Stage 3 chronic kidney disease (H)      Wellness examination    - CBC with platelets; Future  - Comprehensive metabolic panel; Future  - UA Macroscopic with reflex to Microscopic and Culture; Future  - Prostate Specific Antigen Screen; Future  - UA Macroscopic with reflex to Microscopic and Culture  - CBC with platelets  - Comprehensive metabolic panel  - Prostate Specific Antigen Screen  - UA Microscopic with Reflex to Culture    Sinus bradycardia  No pacemaker at this time    Encounter for screening for malignant neoplasm of prostate  Await test results  - Prostate Specific Antigen Screen; Future  - Prostate Specific Antigen Screen          BMI  Estimated body mass index is 25.56 kg/m  as calculated from the following:    Height as of this encounter: 1.74 m (5' 8.5\").    Weight as of this encounter: 77.4 kg (170 lb 9.6 oz).         Follow-up       Anaya Lemon is a 79 year old, presenting for the following health issues: Macario comes in for his annual wellness exam.  I been taking care of him for decades.  I have known him since we were 5 years old so his history and family history are well-known to me.  We have reviewed that today.  Recent problems include elevated kidney function studies under the care of nephrology which include medication management.  Medications were reviewed today at today's visit patient does not have any symptoms with absence of fatigue and other usual signs of bradycardia showing indications for pacemaker at this time.  He has a history of gout he does take allopurinol 100 mg once daily for his benign essential hypertension he is on lisinopril hydrochlorothiazide 20/25 all medical questions asked were answered I have personally reviewed family social history surgeries allergies problems list.  He will follow-up in 6 months  Recheck Medication (Medication follow up )      5/7/2025    10:39 AM   Additional Questions   Roomed by Letha ONOFRE CMA     History of Present " "Illness       Reason for visit:  Medication management                   Review of Systems  CONSTITUTIONAL: NEGATIVE for fever, chills, change in weight  INTEGUMENTARY/SKIN: NEGATIVE for worrisome rashes, moles or lesions  EYES: NEGATIVE for vision changes or irritation  ENT/MOUTH: NEGATIVE for ear, mouth and throat problems  RESP: NEGATIVE for significant cough or SOB  BREAST: NEGATIVE for masses, tenderness or discharge  CV: NEGATIVE for chest pain, palpitations or peripheral edema  GI: NEGATIVE for nausea, abdominal pain, heartburn, or change in bowel habits  : NEGATIVE for frequency, dysuria, or hematuria  MUSCULOSKELETAL: NEGATIVE for significant arthralgias or myalgia  NEURO: NEGATIVE for weakness, dizziness or paresthesias  ENDOCRINE: NEGATIVE for temperature intolerance, skin/hair changes  HEME: NEGATIVE for bleeding problems  PSYCHIATRIC: NEGATIVE for changes in mood or affect      Objective    /68 (BP Location: Left arm, Patient Position: Sitting, Cuff Size: Adult Regular)   Pulse 52   Temp 98.2  F (36.8  C) (Temporal)   Resp 14   Ht 1.74 m (5' 8.5\")   Wt 77.4 kg (170 lb 9.6 oz)   SpO2 97%   BMI 25.56 kg/m    Body mass index is 25.56 kg/m .  Physical Exam   GENERAL: alert and no distress  EYES: Eyes grossly normal to inspection, PERRL and conjunctivae and sclerae normal  HENT: ear canals and TM's normal, nose and mouth without ulcers or lesions  NECK: no adenopathy, no asymmetry, masses, or scars  RESP: lungs clear to auscultation - no rales, rhonchi or wheezes  CV: regular rate and rhythm, normal S1 S2, no S3 or S4, no murmur, click or rub, no peripheral edema  ABDOMEN: soft, nontender, no hepatosplenomegaly, no masses and bowel sounds normal  MS: no gross musculoskeletal defects noted, no edema  SKIN: no suspicious lesions or rashes  NEURO: Normal strength and tone, mentation intact and speech normal  PSYCH: mentation appears normal, affect normal/bright            Signed Electronically " by: Ion Lofton MD

## 2025-05-08 LAB
ALBUMIN SERPL BCG-MCNC: 4.3 G/DL (ref 3.5–5.2)
ALP SERPL-CCNC: 82 U/L (ref 40–150)
ALT SERPL W P-5'-P-CCNC: 8 U/L (ref 0–70)
ANION GAP SERPL CALCULATED.3IONS-SCNC: 11 MMOL/L (ref 7–15)
AST SERPL W P-5'-P-CCNC: 17 U/L (ref 0–45)
BILIRUB SERPL-MCNC: 0.7 MG/DL
BUN SERPL-MCNC: 33.6 MG/DL (ref 8–23)
CALCIUM SERPL-MCNC: 9.2 MG/DL (ref 8.8–10.4)
CHLORIDE SERPL-SCNC: 110 MMOL/L (ref 98–107)
CREAT SERPL-MCNC: 2.87 MG/DL (ref 0.67–1.17)
EGFRCR SERPLBLD CKD-EPI 2021: 21 ML/MIN/1.73M2
GLUCOSE SERPL-MCNC: 101 MG/DL (ref 70–99)
HCO3 SERPL-SCNC: 23 MMOL/L (ref 22–29)
POTASSIUM SERPL-SCNC: 4.3 MMOL/L (ref 3.4–5.3)
PROT SERPL-MCNC: 7.1 G/DL (ref 6.4–8.3)
PSA SERPL DL<=0.01 NG/ML-MCNC: 2.1 NG/ML
SODIUM SERPL-SCNC: 144 MMOL/L (ref 135–145)

## 2025-05-12 ENCOUNTER — RESULTS FOLLOW-UP (OUTPATIENT)
Dept: FAMILY MEDICINE | Facility: CLINIC | Age: 80
End: 2025-05-12
Payer: MEDICARE

## 2025-05-12 NOTE — TELEPHONE ENCOUNTER
"See lab result notes, from the PCP, to the patient, on 5/11/25:    \"Ion Lofton MD to [facility]     All trsts are stable;plz call\"    Writer called the patient and left a message to return call.    When the patient calls back, please relay the above lab result note to the patient.    Please route to the RN queue for any questions or concerns.    Reyna Metzger RN, BSN  [facility]      "